# Patient Record
Sex: FEMALE | Race: AMERICAN INDIAN OR ALASKA NATIVE | Employment: UNEMPLOYED
[De-identification: names, ages, dates, MRNs, and addresses within clinical notes are randomized per-mention and may not be internally consistent; named-entity substitution may affect disease eponyms.]

---

## 2017-01-10 ENCOUNTER — TELEPHONE (OUTPATIENT)
Dept: PULMONOLOGY | Facility: CLINIC | Age: 72
End: 2017-01-10

## 2017-01-10 DIAGNOSIS — J44.1 ACUTE EXACERBATION OF COPD WITH ASTHMA (HCC): Primary | ICD-10-CM

## 2017-01-10 DIAGNOSIS — J45.901 ACUTE EXACERBATION OF COPD WITH ASTHMA (HCC): Primary | ICD-10-CM

## 2017-01-10 RX ORDER — LEVOFLOXACIN 500 MG/1
500 TABLET, FILM COATED ORAL DAILY
Qty: 5 TABLET | Refills: 0 | Status: SHIPPED | OUTPATIENT
Start: 2017-01-10 | End: 2017-01-15

## 2017-01-20 ENCOUNTER — OFFICE VISIT (OUTPATIENT)
Dept: PULMONOLOGY | Facility: CLINIC | Age: 72
End: 2017-01-20

## 2017-01-20 VITALS
WEIGHT: 117 LBS | HEIGHT: 55 IN | DIASTOLIC BLOOD PRESSURE: 101 MMHG | HEART RATE: 57 BPM | OXYGEN SATURATION: 96 % | BODY MASS INDEX: 27.08 KG/M2 | SYSTOLIC BLOOD PRESSURE: 202 MMHG | RESPIRATION RATE: 16 BRPM

## 2017-01-20 VITALS — HEART RATE: 75 BPM | WEIGHT: 117 LBS | BODY MASS INDEX: 27.08 KG/M2 | HEIGHT: 55 IN

## 2017-01-20 DIAGNOSIS — J45.909 ASTHMA IN ADULT, UNSPECIFIED ASTHMA SEVERITY, UNCOMPLICATED: Primary | ICD-10-CM

## 2017-01-20 DIAGNOSIS — J45.52 ASTHMA, SEVERE PERSISTENT, POORLY-CONTROLLED, WITH STATUS ASTHMATICUS: ICD-10-CM

## 2017-01-20 DIAGNOSIS — J45.52 ASTHMA, SEVERE PERSISTENT, POORLY-CONTROLLED, WITH STATUS ASTHMATICUS: Primary | ICD-10-CM

## 2017-01-20 PROCEDURE — 4040F PNEUMOC VAC/ADMIN/RCVD: CPT | Performed by: INTERNAL MEDICINE

## 2017-01-20 PROCEDURE — G8399 PT W/DXA RESULTS DOCUMENT: HCPCS | Performed by: INTERNAL MEDICINE

## 2017-01-20 PROCEDURE — G8484 FLU IMMUNIZE NO ADMIN: HCPCS | Performed by: INTERNAL MEDICINE

## 2017-01-20 PROCEDURE — G8420 CALC BMI NORM PARAMETERS: HCPCS | Performed by: INTERNAL MEDICINE

## 2017-01-20 PROCEDURE — 3017F COLORECTAL CA SCREEN DOC REV: CPT | Performed by: INTERNAL MEDICINE

## 2017-01-20 PROCEDURE — 3014F SCREEN MAMMO DOC REV: CPT | Performed by: INTERNAL MEDICINE

## 2017-01-20 PROCEDURE — G8427 DOCREV CUR MEDS BY ELIG CLIN: HCPCS | Performed by: INTERNAL MEDICINE

## 2017-01-20 PROCEDURE — 1123F ACP DISCUSS/DSCN MKR DOCD: CPT | Performed by: INTERNAL MEDICINE

## 2017-01-20 PROCEDURE — 99213 OFFICE O/P EST LOW 20 MIN: CPT | Performed by: INTERNAL MEDICINE

## 2017-01-20 PROCEDURE — 1090F PRES/ABSN URINE INCON ASSESS: CPT | Performed by: INTERNAL MEDICINE

## 2017-01-20 PROCEDURE — 1036F TOBACCO NON-USER: CPT | Performed by: INTERNAL MEDICINE

## 2017-01-20 RX ORDER — MONTELUKAST SODIUM 10 MG/1
10 TABLET ORAL NIGHTLY
Qty: 90 TABLET | Refills: 3 | Status: SHIPPED | OUTPATIENT
Start: 2017-01-20 | End: 2018-01-30 | Stop reason: SDUPTHER

## 2017-01-20 RX ORDER — BUDESONIDE 0.5 MG/2ML
1 INHALANT ORAL 2 TIMES DAILY
COMMUNITY
End: 2017-01-20 | Stop reason: ALTCHOICE

## 2017-01-20 RX ORDER — PREDNISONE 10 MG/1
TABLET ORAL
Qty: 30 TABLET | Refills: 0 | Status: SHIPPED | OUTPATIENT
Start: 2017-01-20 | End: 2017-05-15 | Stop reason: ALTCHOICE

## 2017-01-20 RX ORDER — ALBUTEROL SULFATE 90 UG/1
2 AEROSOL, METERED RESPIRATORY (INHALATION) EVERY 6 HOURS PRN
Qty: 3 INHALER | Refills: 3 | Status: SHIPPED | OUTPATIENT
Start: 2017-01-20 | End: 2018-06-25 | Stop reason: SDUPTHER

## 2017-01-20 RX ORDER — PREDNISONE 10 MG/1
TABLET ORAL
Qty: 30 TABLET | Refills: 0 | Status: SHIPPED | OUTPATIENT
Start: 2017-01-20 | End: 2017-01-20 | Stop reason: SDUPTHER

## 2017-01-20 ASSESSMENT — ENCOUNTER SYMPTOMS
SHORTNESS OF BREATH: 1
EYES NEGATIVE: 1
GASTROINTESTINAL NEGATIVE: 1
COUGH: 1
WHEEZING: 1

## 2017-02-20 ENCOUNTER — OFFICE VISIT (OUTPATIENT)
Dept: PULMONOLOGY | Facility: CLINIC | Age: 72
End: 2017-02-20

## 2017-02-20 VITALS
RESPIRATION RATE: 16 BRPM | OXYGEN SATURATION: 97 % | DIASTOLIC BLOOD PRESSURE: 96 MMHG | BODY MASS INDEX: 27.58 KG/M2 | HEIGHT: 56 IN | WEIGHT: 122.6 LBS | HEART RATE: 50 BPM | SYSTOLIC BLOOD PRESSURE: 177 MMHG

## 2017-02-20 DIAGNOSIS — J45.909 ASTHMA, VERY POORLY CONTROLLED, UNSPECIFIED ASTHMA SEVERITY, UNCOMPLICATED: Primary | ICD-10-CM

## 2017-02-20 DIAGNOSIS — F51.04 PSYCHOPHYSIOLOGIC INSOMNIA: ICD-10-CM

## 2017-02-20 PROCEDURE — 1036F TOBACCO NON-USER: CPT | Performed by: INTERNAL MEDICINE

## 2017-02-20 PROCEDURE — 3017F COLORECTAL CA SCREEN DOC REV: CPT | Performed by: INTERNAL MEDICINE

## 2017-02-20 PROCEDURE — 1123F ACP DISCUSS/DSCN MKR DOCD: CPT | Performed by: INTERNAL MEDICINE

## 2017-02-20 PROCEDURE — 3014F SCREEN MAMMO DOC REV: CPT | Performed by: INTERNAL MEDICINE

## 2017-02-20 PROCEDURE — G8427 DOCREV CUR MEDS BY ELIG CLIN: HCPCS | Performed by: INTERNAL MEDICINE

## 2017-02-20 PROCEDURE — G8399 PT W/DXA RESULTS DOCUMENT: HCPCS | Performed by: INTERNAL MEDICINE

## 2017-02-20 PROCEDURE — G8484 FLU IMMUNIZE NO ADMIN: HCPCS | Performed by: INTERNAL MEDICINE

## 2017-02-20 PROCEDURE — 99213 OFFICE O/P EST LOW 20 MIN: CPT | Performed by: INTERNAL MEDICINE

## 2017-02-20 PROCEDURE — 4040F PNEUMOC VAC/ADMIN/RCVD: CPT | Performed by: INTERNAL MEDICINE

## 2017-02-20 PROCEDURE — G8420 CALC BMI NORM PARAMETERS: HCPCS | Performed by: INTERNAL MEDICINE

## 2017-02-20 PROCEDURE — 1090F PRES/ABSN URINE INCON ASSESS: CPT | Performed by: INTERNAL MEDICINE

## 2017-02-20 RX ORDER — BUDESONIDE AND FORMOTEROL FUMARATE DIHYDRATE 160; 4.5 UG/1; UG/1
2 AEROSOL RESPIRATORY (INHALATION) 2 TIMES DAILY
Qty: 1 INHALER | Refills: 11 | Status: SHIPPED | OUTPATIENT
Start: 2017-02-20 | End: 2017-05-15

## 2017-02-20 RX ORDER — BUDESONIDE AND FORMOTEROL FUMARATE DIHYDRATE 160; 4.5 UG/1; UG/1
2 AEROSOL RESPIRATORY (INHALATION) 2 TIMES DAILY
Qty: 2 INHALER | Refills: 0 | COMMUNITY
Start: 2017-02-20 | End: 2017-05-15 | Stop reason: SDUPTHER

## 2017-02-20 RX ORDER — POTASSIUM CHLORIDE 20 MEQ/1
TABLET, EXTENDED RELEASE ORAL
COMMUNITY
Start: 2017-02-19

## 2017-02-20 RX ORDER — BUDESONIDE AND FORMOTEROL FUMARATE DIHYDRATE 160; 4.5 UG/1; UG/1
2 AEROSOL RESPIRATORY (INHALATION) 2 TIMES DAILY
COMMUNITY
End: 2017-05-15 | Stop reason: SDUPTHER

## 2017-02-20 ASSESSMENT — ENCOUNTER SYMPTOMS
GASTROINTESTINAL NEGATIVE: 1
WHEEZING: 1
SHORTNESS OF BREATH: 1
EYES NEGATIVE: 1

## 2017-05-15 ENCOUNTER — OFFICE VISIT (OUTPATIENT)
Dept: PULMONOLOGY | Age: 72
End: 2017-05-15
Payer: MEDICARE

## 2017-05-15 VITALS
TEMPERATURE: 97.2 F | HEART RATE: 49 BPM | DIASTOLIC BLOOD PRESSURE: 88 MMHG | SYSTOLIC BLOOD PRESSURE: 175 MMHG | HEIGHT: 56 IN | OXYGEN SATURATION: 98 % | WEIGHT: 122 LBS | BODY MASS INDEX: 27.44 KG/M2 | RESPIRATION RATE: 12 BRPM

## 2017-05-15 DIAGNOSIS — J45.50 ASTHMA, SEVERE PERSISTENT, WELL-CONTROLLED: Primary | ICD-10-CM

## 2017-05-15 PROCEDURE — 1036F TOBACCO NON-USER: CPT | Performed by: INTERNAL MEDICINE

## 2017-05-15 PROCEDURE — 3014F SCREEN MAMMO DOC REV: CPT | Performed by: INTERNAL MEDICINE

## 2017-05-15 PROCEDURE — 3017F COLORECTAL CA SCREEN DOC REV: CPT | Performed by: INTERNAL MEDICINE

## 2017-05-15 PROCEDURE — 1123F ACP DISCUSS/DSCN MKR DOCD: CPT | Performed by: INTERNAL MEDICINE

## 2017-05-15 PROCEDURE — G8399 PT W/DXA RESULTS DOCUMENT: HCPCS | Performed by: INTERNAL MEDICINE

## 2017-05-15 PROCEDURE — G8420 CALC BMI NORM PARAMETERS: HCPCS | Performed by: INTERNAL MEDICINE

## 2017-05-15 PROCEDURE — G8427 DOCREV CUR MEDS BY ELIG CLIN: HCPCS | Performed by: INTERNAL MEDICINE

## 2017-05-15 PROCEDURE — 99213 OFFICE O/P EST LOW 20 MIN: CPT | Performed by: INTERNAL MEDICINE

## 2017-05-15 PROCEDURE — 4040F PNEUMOC VAC/ADMIN/RCVD: CPT | Performed by: INTERNAL MEDICINE

## 2017-05-15 PROCEDURE — 1090F PRES/ABSN URINE INCON ASSESS: CPT | Performed by: INTERNAL MEDICINE

## 2017-05-15 RX ORDER — BUDESONIDE 0.5 MG/2ML
1 INHALANT ORAL 2 TIMES DAILY
COMMUNITY
End: 2017-05-15 | Stop reason: ALTCHOICE

## 2017-05-15 RX ORDER — MELOXICAM 15 MG/1
15 TABLET ORAL DAILY
COMMUNITY
End: 2018-02-27 | Stop reason: ALTCHOICE

## 2017-05-15 RX ORDER — FERROUS SULFATE 325(65) MG
325 TABLET ORAL
COMMUNITY

## 2017-05-15 RX ORDER — FEXOFENADINE HYDROCHLORIDE 60 MG/1
120 TABLET, FILM COATED ORAL DAILY
COMMUNITY
End: 2018-02-27 | Stop reason: ALTCHOICE

## 2017-05-15 ASSESSMENT — ENCOUNTER SYMPTOMS
GASTROINTESTINAL NEGATIVE: 1
RESPIRATORY NEGATIVE: 1
EYES NEGATIVE: 1

## 2017-10-16 ENCOUNTER — OFFICE VISIT (OUTPATIENT)
Dept: PULMONOLOGY | Age: 72
End: 2017-10-16
Payer: MEDICARE

## 2017-10-16 VITALS
OXYGEN SATURATION: 99 % | HEART RATE: 80 BPM | RESPIRATION RATE: 16 BRPM | SYSTOLIC BLOOD PRESSURE: 169 MMHG | DIASTOLIC BLOOD PRESSURE: 89 MMHG | BODY MASS INDEX: 23.59 KG/M2 | WEIGHT: 117 LBS | HEIGHT: 59 IN

## 2017-10-16 DIAGNOSIS — J45.50 ASTHMA, SEVERE PERSISTENT, WELL-CONTROLLED: Primary | ICD-10-CM

## 2017-10-16 PROCEDURE — 4040F PNEUMOC VAC/ADMIN/RCVD: CPT | Performed by: INTERNAL MEDICINE

## 2017-10-16 PROCEDURE — 1036F TOBACCO NON-USER: CPT | Performed by: INTERNAL MEDICINE

## 2017-10-16 PROCEDURE — 1123F ACP DISCUSS/DSCN MKR DOCD: CPT | Performed by: INTERNAL MEDICINE

## 2017-10-16 PROCEDURE — G8427 DOCREV CUR MEDS BY ELIG CLIN: HCPCS | Performed by: INTERNAL MEDICINE

## 2017-10-16 PROCEDURE — 3014F SCREEN MAMMO DOC REV: CPT | Performed by: INTERNAL MEDICINE

## 2017-10-16 PROCEDURE — G8420 CALC BMI NORM PARAMETERS: HCPCS | Performed by: INTERNAL MEDICINE

## 2017-10-16 PROCEDURE — 1090F PRES/ABSN URINE INCON ASSESS: CPT | Performed by: INTERNAL MEDICINE

## 2017-10-16 PROCEDURE — G8484 FLU IMMUNIZE NO ADMIN: HCPCS | Performed by: INTERNAL MEDICINE

## 2017-10-16 PROCEDURE — 99213 OFFICE O/P EST LOW 20 MIN: CPT | Performed by: INTERNAL MEDICINE

## 2017-10-16 PROCEDURE — G8399 PT W/DXA RESULTS DOCUMENT: HCPCS | Performed by: INTERNAL MEDICINE

## 2017-10-16 PROCEDURE — 3017F COLORECTAL CA SCREEN DOC REV: CPT | Performed by: INTERNAL MEDICINE

## 2017-10-16 ASSESSMENT — ENCOUNTER SYMPTOMS
EYES NEGATIVE: 1
RESPIRATORY NEGATIVE: 1
GASTROINTESTINAL NEGATIVE: 1

## 2018-01-30 DIAGNOSIS — J45.52 ASTHMA, SEVERE PERSISTENT, POORLY-CONTROLLED, WITH STATUS ASTHMATICUS: ICD-10-CM

## 2018-01-30 RX ORDER — MONTELUKAST SODIUM 10 MG/1
TABLET ORAL
Qty: 90 TABLET | Refills: 3 | Status: SHIPPED | OUTPATIENT
Start: 2018-01-30 | End: 2019-01-28 | Stop reason: SDUPTHER

## 2018-02-27 ENCOUNTER — OFFICE VISIT (OUTPATIENT)
Dept: PULMONOLOGY | Age: 73
End: 2018-02-27
Payer: MEDICARE

## 2018-02-27 VITALS
WEIGHT: 118 LBS | DIASTOLIC BLOOD PRESSURE: 90 MMHG | HEART RATE: 48 BPM | OXYGEN SATURATION: 96 % | BODY MASS INDEX: 27.31 KG/M2 | TEMPERATURE: 97 F | SYSTOLIC BLOOD PRESSURE: 176 MMHG | RESPIRATION RATE: 14 BRPM | HEIGHT: 55 IN

## 2018-02-27 DIAGNOSIS — R05.9 COUGH IN ADULT: ICD-10-CM

## 2018-02-27 DIAGNOSIS — R09.82 POSTNASAL DRIP: ICD-10-CM

## 2018-02-27 DIAGNOSIS — J45.50 ASTHMA, SEVERE PERSISTENT, WELL-CONTROLLED: Primary | ICD-10-CM

## 2018-02-27 PROCEDURE — 99213 OFFICE O/P EST LOW 20 MIN: CPT | Performed by: NURSE PRACTITIONER

## 2018-02-27 RX ORDER — LOSARTAN POTASSIUM 50 MG/1
50 TABLET ORAL DAILY
COMMUNITY

## 2018-02-27 RX ORDER — FLUTICASONE PROPIONATE 50 MCG
1 SPRAY, SUSPENSION (ML) NASAL DAILY
COMMUNITY

## 2018-02-27 ASSESSMENT — ENCOUNTER SYMPTOMS
COUGH: 1
ALLERGIC/IMMUNOLOGIC NEGATIVE: 1
GASTROINTESTINAL NEGATIVE: 1
EYES NEGATIVE: 1

## 2018-02-27 NOTE — PROGRESS NOTES
Segs Absolute 5.08 1.8 - 7.7 k/uL    Absolute Lymph # 2.14 1.0 - 4.8 k/uL    Absolute Mono # 0.36 0.2 - 0.8 k/uL    Absolute Eos # 0.30 0.0 - 0.4 k/uL    Basophils # 0.04 0.0 - 0.2 k/uL   Hepatic Function Panel   Result Value Ref Range    Alb 3.9 3.5 - 5.2 g/dL    Alkaline Phosphatase 59 35 - 104 U/L    ALT 8 5 - 33 U/L    AST 26 <32 U/L    Total Bilirubin 0.70 0.3 - 1.2 mg/dL    Bilirubin, Direct 0.16 <0.31 mg/dL    Bilirubin, Indirect 0.54 0.00 - 1.00 mg/dL    Total Protein 7.2 6.4 - 8.3 g/dL    Globulin NOT REPORTED 1.5 - 3.8 g/dL    Albumin/Globulin Ratio NOT REPORTED 1.7 - 2.5   Lipase   Result Value Ref Range    Lipase 16 13 - 60 U/L   Urinalysis   Result Value Ref Range    Color, UA STRAW (A) YEL    Turbidity UA CLEAR CLEAR    Glucose, Ur NEGATIVE NEG    Bilirubin Urine NEGATIVE NEG    Ketones, Urine NEGATIVE NEG    Specific Gravity, UA 1.015 1.005 - 1.030    Urine Hgb 2+ (A) NEG    pH, UA 7.5 5.0 - 8.0    Protein, UA NEGATIVE NEG    Urobilinogen, Urine Normal NORM    Nitrite, Urine NEGATIVE NEG    Leukocyte Esterase, Urine TRACE (A) NEG    Urinalysis Comments NOT REPORTED    Magnesium   Result Value Ref Range    Magnesium 1.6 1.6 - 2.6 mg/dL   Microscopic Urinalysis   Result Value Ref Range    -          WBC, UA 0 TO 2 0 - 5 /HPF    RBC, UA 5 TO 10 0 - 2 /HPF    Casts UA NOT REPORTED 0 - 2 /LPF    Crystals UA NOT REPORTED NONE /HPF    Epithelial Cells UA FEW /HPF    Renal Epithelial, Urine NOT REPORTED 0 /HPF    Bacteria, UA FEW (A) NONE    Mucus, UA NOT REPORTED NONE    Trichomonas, UA NOT REPORTED NONE    Amorphous, UA 1+ (A) NONE    Other Observations UA NOT REPORTED NREQ    Yeast, UA NOT REPORTED NONE       No results found. Assessment:      1. Asthma, severe persistent, well-controlled    2. Postnasal drip    3. Cough in adult          Plan:      1. Medications reviewed, continue as ordered. Dulera and Singulair. 2. Refills were provided - no  3.  Educated and clarified the medication

## 2018-04-04 ENCOUNTER — TELEPHONE (OUTPATIENT)
Dept: PULMONOLOGY | Age: 73
End: 2018-04-04

## 2018-04-04 DIAGNOSIS — J45.51 SEVERE PERSISTENT ASTHMA WITH EXACERBATION: Primary | ICD-10-CM

## 2018-04-04 RX ORDER — PREDNISONE 20 MG/1
40 TABLET ORAL DAILY
Qty: 10 TABLET | Refills: 0 | Status: SHIPPED | OUTPATIENT
Start: 2018-04-04 | End: 2018-04-09

## 2018-06-25 DIAGNOSIS — J45.50 ASTHMA, SEVERE PERSISTENT, WELL-CONTROLLED: ICD-10-CM

## 2018-06-25 DIAGNOSIS — J45.52 ASTHMA, SEVERE PERSISTENT, POORLY-CONTROLLED, WITH STATUS ASTHMATICUS: ICD-10-CM

## 2018-06-25 DIAGNOSIS — J45.909 ASTHMA, VERY POORLY CONTROLLED, UNSPECIFIED ASTHMA SEVERITY, UNCOMPLICATED: ICD-10-CM

## 2018-06-25 RX ORDER — MOMETASONE FUROATE AND FORMOTEROL FUMARATE DIHYDRATE 200; 5 UG/1; UG/1
AEROSOL RESPIRATORY (INHALATION)
Qty: 3 INHALER | Refills: 2 | Status: SHIPPED | OUTPATIENT
Start: 2018-06-25 | End: 2021-03-16 | Stop reason: SDUPTHER

## 2018-06-25 RX ORDER — BUDESONIDE AND FORMOTEROL FUMARATE DIHYDRATE 160; 4.5 UG/1; UG/1
AEROSOL RESPIRATORY (INHALATION)
Qty: 10.2 G | Refills: 11 | OUTPATIENT
Start: 2018-06-25

## 2019-01-28 DIAGNOSIS — J45.52 ASTHMA, SEVERE PERSISTENT, POORLY-CONTROLLED, WITH STATUS ASTHMATICUS: ICD-10-CM

## 2019-01-28 RX ORDER — MONTELUKAST SODIUM 10 MG/1
TABLET ORAL
Qty: 90 TABLET | Refills: 0 | Status: SHIPPED | OUTPATIENT
Start: 2019-01-28 | End: 2019-04-28 | Stop reason: SDUPTHER

## 2019-04-28 DIAGNOSIS — J45.52 ASTHMA, SEVERE PERSISTENT, POORLY-CONTROLLED, WITH STATUS ASTHMATICUS: ICD-10-CM

## 2019-04-29 RX ORDER — MONTELUKAST SODIUM 10 MG/1
TABLET ORAL
Qty: 90 TABLET | Refills: 3 | Status: SHIPPED | OUTPATIENT
Start: 2019-04-29 | End: 2020-04-24

## 2019-04-29 NOTE — TELEPHONE ENCOUNTER
Dr Be Man, patient last seen in the office on 2/27/18 by [facility]. She cancelled her appointment in March and is now scheduled to see you on 4/30/19. Per Jolene Escobar last dictation patient to continue this medication. Please sign for refill if ok. Thank you.

## 2019-04-30 ENCOUNTER — HOSPITAL ENCOUNTER (OUTPATIENT)
Age: 74
Discharge: HOME | End: 2019-04-30
Payer: MEDICARE

## 2019-04-30 ENCOUNTER — OFFICE VISIT (OUTPATIENT)
Dept: PULMONOLOGY | Age: 74
End: 2019-04-30
Payer: MEDICARE

## 2019-04-30 VITALS
SYSTOLIC BLOOD PRESSURE: 139 MMHG | WEIGHT: 124 LBS | HEART RATE: 64 BPM | HEIGHT: 56 IN | RESPIRATION RATE: 14 BRPM | OXYGEN SATURATION: 98 % | BODY MASS INDEX: 27.9 KG/M2 | DIASTOLIC BLOOD PRESSURE: 89 MMHG

## 2019-04-30 DIAGNOSIS — J45.52 ASTHMA, SEVERE PERSISTENT, POORLY-CONTROLLED, WITH STATUS ASTHMATICUS: ICD-10-CM

## 2019-04-30 DIAGNOSIS — J82.83 EOSINOPHILIC ASTHMA: ICD-10-CM

## 2019-04-30 DIAGNOSIS — J45.52 ASTHMA, SEVERE PERSISTENT, POORLY-CONTROLLED, WITH STATUS ASTHMATICUS: Primary | ICD-10-CM

## 2019-04-30 LAB
EOSINOPHILS ABSOLUTE: 523 /UL (ref 200–400)
EOSINOPHILS RELATIVE PERCENT: ABNORMAL %
IGE: 53 IU/ML
WBC # BLD: ABNORMAL K/UL

## 2019-04-30 PROCEDURE — 1090F PRES/ABSN URINE INCON ASSESS: CPT | Performed by: INTERNAL MEDICINE

## 2019-04-30 PROCEDURE — 3017F COLORECTAL CA SCREEN DOC REV: CPT | Performed by: INTERNAL MEDICINE

## 2019-04-30 PROCEDURE — G8399 PT W/DXA RESULTS DOCUMENT: HCPCS | Performed by: INTERNAL MEDICINE

## 2019-04-30 PROCEDURE — 85048 AUTOMATED LEUKOCYTE COUNT: CPT

## 2019-04-30 PROCEDURE — 4040F PNEUMOC VAC/ADMIN/RCVD: CPT | Performed by: INTERNAL MEDICINE

## 2019-04-30 PROCEDURE — G8419 CALC BMI OUT NRM PARAM NOF/U: HCPCS | Performed by: INTERNAL MEDICINE

## 2019-04-30 PROCEDURE — 99213 OFFICE O/P EST LOW 20 MIN: CPT | Performed by: INTERNAL MEDICINE

## 2019-04-30 PROCEDURE — 1036F TOBACCO NON-USER: CPT | Performed by: INTERNAL MEDICINE

## 2019-04-30 PROCEDURE — 1123F ACP DISCUSS/DSCN MKR DOCD: CPT | Performed by: INTERNAL MEDICINE

## 2019-04-30 PROCEDURE — 82785 ASSAY OF IGE: CPT

## 2019-04-30 PROCEDURE — 36415 COLL VENOUS BLD VENIPUNCTURE: CPT

## 2019-04-30 PROCEDURE — G8427 DOCREV CUR MEDS BY ELIG CLIN: HCPCS | Performed by: INTERNAL MEDICINE

## 2019-04-30 RX ORDER — PREDNISONE 20 MG/1
40 TABLET ORAL DAILY
Qty: 10 TABLET | Refills: 0 | Status: SHIPPED | OUTPATIENT
Start: 2019-04-30 | End: 2019-05-05

## 2019-05-02 ASSESSMENT — ENCOUNTER SYMPTOMS
WHEEZING: 1
GASTROINTESTINAL NEGATIVE: 1
COUGH: 1
CHEST TIGHTNESS: 1
SHORTNESS OF BREATH: 1
EYES NEGATIVE: 1

## 2019-05-02 NOTE — PROGRESS NOTES
Subjective:      Patient ID: Jayesh Clark is a 69 y.o. female. HPI  Follow-up visit for asthma. Since her visit a year ago asthma has not been under good control. She states that control worsened about 2 or 3 months ago. Daily shortness of breath, wheezing, cough, and need for albuterol aerosols. FENO elevated at 32 ppb. Uses Dulera 200 µg, Singulair 10 mg, and Ventolin HFA several times a day. Denies environmental triggers. No recent IgE or eosinophil count. Review of Systems   Constitutional: Negative. HENT: Negative. Eyes: Negative. Respiratory: Positive for cough, chest tightness, shortness of breath and wheezing. Cardiovascular: Negative. Gastrointestinal: Negative. All other systems reviewed and are negative. Objective:     Physical Exam   Constitutional: She is oriented to person, place, and time. She appears well-developed and well-nourished. HENT:   Head: Normocephalic. Right Ear: External ear normal.   Left Ear: External ear normal.   Mouth/Throat: Oropharynx is clear and moist. No oropharyngeal exudate. Eyes: Conjunctivae are normal. No scleral icterus. Neck: Neck supple. No JVD present. No tracheal deviation present. No thyromegaly present. Cardiovascular: Normal rate, regular rhythm and normal heart sounds. Exam reveals no gallop. No murmur heard. Pulmonary/Chest: Effort normal. No respiratory distress. She has no wheezes. She has no rales. She exhibits no tenderness. Abdominal: Soft. There is no tenderness. Musculoskeletal: She exhibits no edema. Lymphadenopathy:     She has no cervical adenopathy. Neurological: She is alert and oriented to person, place, and time. Skin: Skin is warm and dry. Nursing note and vitals reviewed.       Wt Readings from Last 3 Encounters:   04/30/19 124 lb (56.2 kg)   02/27/18 118 lb (53.5 kg)   10/16/17 117 lb (53.1 kg)          Results for orders placed or performed during the hospital encounter of 06/11/13 Sputum culture   Result Value Ref Range    Specimen EXPECTORATED SPUTUM     Special Requests NOT REPORTED     Culture (A)      >10 EPITHELIAL CELLS/LPF: SPECIMEN IS CONTAMINATED WITH ORAL PHARYNGEAL ELBA AND    Culture (A)       IS UNACCEPTABLE FOR BACTERIAL CULTURE. PLEASE SUBMIT ANOTHER SPECIMEN. Culture ANGELA NOTIFIED 6/10/13 AT 1400. Status Pending     Culture       [address], [place] [facility] 3 [phone]    Status FINAL 06/10/2013    Gram stain   Result Value Ref Range    Specimen EXPECTORATED SPUTUM     Special Requests NOT REPORTED     Direct Exam (A)      >10 EPITHELIAL CELLS/LPF: SPECIMEN IS CONTAMINATED WITH ORAL PHARYNGEAL ELBA AND    Direct Exam (A)       IS UNACCEPTABLE FOR BACTERIAL CULTURE. PLEASE SUBMIT ANOTHER SPECIMEN. Direct Exam Karyn Triplett NOTIFIED 6/10/13 AT 1400. Status Pending     Direct Exam       [address], [place] [facility] 3 [phone]    Status FINAL 06/10/2013        Assessment:         1. Asthma, severe persistent, poorly-controlled, with status asthmaticus    2. Eosinophilic asthma ([facility] [address]          Plan:      1. Self-reported symptoms and elevated FENO suggests poorly controlled asthma. 2. Prednisone 40 mg daily for 5 days. 3. Serum eosinophils and IgE. 4. Discussed in generalities biologic therapy for asthma. 5. Return in 6 weeks.      Electronically signed by Dorothe Opitz, DO on 5/2/2019at 1:18 PM

## 2019-06-11 ENCOUNTER — OFFICE VISIT (OUTPATIENT)
Dept: PULMONOLOGY | Age: 74
End: 2019-06-11
Payer: MEDICARE

## 2019-06-11 VITALS
BODY MASS INDEX: 25.89 KG/M2 | HEART RATE: 58 BPM | HEIGHT: 57 IN | RESPIRATION RATE: 12 BRPM | SYSTOLIC BLOOD PRESSURE: 176 MMHG | DIASTOLIC BLOOD PRESSURE: 99 MMHG | OXYGEN SATURATION: 98 % | WEIGHT: 120 LBS

## 2019-06-11 DIAGNOSIS — J82.83 EOSINOPHILIC ASTHMA: Primary | ICD-10-CM

## 2019-06-11 DIAGNOSIS — I10 ESSENTIAL HYPERTENSION: ICD-10-CM

## 2019-06-11 DIAGNOSIS — Z91.09 MULTIPLE ENVIRONMENTAL ALLERGIES: ICD-10-CM

## 2019-06-11 DIAGNOSIS — J45.50 POORLY CONTROLLED SEVERE PERSISTENT ASTHMA WITHOUT COMPLICATION: ICD-10-CM

## 2019-06-11 PROCEDURE — 4040F PNEUMOC VAC/ADMIN/RCVD: CPT | Performed by: INTERNAL MEDICINE

## 2019-06-11 PROCEDURE — 1123F ACP DISCUSS/DSCN MKR DOCD: CPT | Performed by: INTERNAL MEDICINE

## 2019-06-11 PROCEDURE — 1036F TOBACCO NON-USER: CPT | Performed by: INTERNAL MEDICINE

## 2019-06-11 PROCEDURE — 99214 OFFICE O/P EST MOD 30 MIN: CPT | Performed by: INTERNAL MEDICINE

## 2019-06-11 PROCEDURE — 1090F PRES/ABSN URINE INCON ASSESS: CPT | Performed by: INTERNAL MEDICINE

## 2019-06-11 PROCEDURE — G8399 PT W/DXA RESULTS DOCUMENT: HCPCS | Performed by: INTERNAL MEDICINE

## 2019-06-11 PROCEDURE — 3017F COLORECTAL CA SCREEN DOC REV: CPT | Performed by: INTERNAL MEDICINE

## 2019-06-11 PROCEDURE — G8427 DOCREV CUR MEDS BY ELIG CLIN: HCPCS | Performed by: INTERNAL MEDICINE

## 2019-06-11 PROCEDURE — G8419 CALC BMI OUT NRM PARAM NOF/U: HCPCS | Performed by: INTERNAL MEDICINE

## 2019-06-11 ASSESSMENT — ENCOUNTER SYMPTOMS
EYES NEGATIVE: 1
WHEEZING: 1
SHORTNESS OF BREATH: 1
COUGH: 1
GASTROINTESTINAL NEGATIVE: 1

## 2019-06-11 NOTE — PROGRESS NOTES
Subjective:      Patient ID: Ozzy Vora is a 69 y.o. female. HPI  Follow-up visit for severe, persistent asthma. Unfortunately asthma remains under poor control. Daily cough, shortness of breath, and wheezing. Also reports nocturnal symptoms. Uses albuterol \"frequently\". Also on albuterol aerosols. Compliant with Dulera 200 mcg and montelukast.  Serum IgE not elevated at 43. Eosinophils elevated at 523/mcL. Currently receiving immunotherapy per Dr. Gray Larsen. Review of Systems   Constitutional: Negative. HENT: Negative. Eyes: Negative. Respiratory: Positive for cough, shortness of breath and wheezing. Cardiovascular: Negative. Gastrointestinal: Negative. All other systems reviewed and are negative. Objective:     Physical Exam   Constitutional: She is oriented to person, place, and time. She appears well-developed and well-nourished. HENT:   Head: Normocephalic. Right Ear: External ear normal.   Left Ear: External ear normal.   Mouth/Throat: Oropharynx is clear and moist. No oropharyngeal exudate. Eyes: Conjunctivae are normal. No scleral icterus. Neck: Neck supple. No JVD present. No tracheal deviation present. No thyromegaly present. Cardiovascular: Normal rate, regular rhythm and normal heart sounds. Exam reveals no gallop. No murmur heard. Pulmonary/Chest: Effort normal. No respiratory distress. She has no wheezes. She has no rales. She exhibits no tenderness. Abdominal: Soft. There is no tenderness. Musculoskeletal: She exhibits no edema. Lymphadenopathy:     She has no cervical adenopathy. Neurological: She is alert and oriented to person, place, and time. Skin: Skin is warm and dry. Nursing note and vitals reviewed.       Wt Readings from Last 3 Encounters:   06/11/19 120 lb (54.4 kg)   04/30/19 124 lb (56.2 kg)   02/27/18 118 lb (53.5 kg)          Results for orders placed or performed during the hospital encounter of 04/30/19   IgE   Result Value Ref Range    IgE 53 <101 IU/mL   Eosinophil Count   Result Value Ref Range    WBC NOT REPORTED k/uL    Eosinophils % NOT REPORTED %    Eosinophils # 523 (H) 200 - 400 /uL       Assessment:         1. Eosinophilic asthma ([address]    2. Poorly controlled severe persistent asthma without complication    3. Multiple environmental allergies    4. Essential hypertension          Plan:      1. Elevated eosinophils suggests of eosinophilic asthma. Patient a candidate for biologic therapy. Will begin Nucala 100 mcg subcu each month. Discussed risks benefits and rationale and she accepts. Patient states that she has had chickenpox. Cannot recall whether she has had a shingles vaccine. Discussed the slight risk of shingles reactivation with nucala and patient wishes to proceed as quickly as possible with treatment. 2. Continue bronchodilators. 3. Immunotherapy per allergist.  4. Follow-up with Dr. Betito Lee regarding elevated blood pressure. 5. Return in 3 months.      Electronically signed by Maricel Ponce DO on 6/11/2019at 1:03 PM

## 2019-07-03 DIAGNOSIS — J45.52 ASTHMA, SEVERE PERSISTENT, POORLY-CONTROLLED, WITH STATUS ASTHMATICUS: ICD-10-CM

## 2019-07-12 RX ORDER — EPINEPHRINE 0.3 MG/.3ML
0.3 INJECTION SUBCUTANEOUS PRN
Qty: 2 EACH | Refills: 0 | Status: SHIPPED | OUTPATIENT
Start: 2019-07-12

## 2019-07-15 ENCOUNTER — TELEPHONE (OUTPATIENT)
Dept: PULMONOLOGY | Age: 74
End: 2019-07-15

## 2019-07-15 ENCOUNTER — OFFICE VISIT (OUTPATIENT)
Dept: PULMONOLOGY | Age: 74
End: 2019-07-15
Payer: MEDICARE

## 2019-07-15 VITALS — BODY MASS INDEX: 25.89 KG/M2 | HEIGHT: 57 IN | WEIGHT: 120 LBS

## 2019-07-15 DIAGNOSIS — J82.83 EOSINOPHILIC ASTHMA: ICD-10-CM

## 2019-07-15 PROCEDURE — 96372 THER/PROPH/DIAG INJ SC/IM: CPT | Performed by: INTERNAL MEDICINE

## 2019-08-12 ENCOUNTER — OFFICE VISIT (OUTPATIENT)
Dept: PULMONOLOGY | Age: 74
End: 2019-08-12
Payer: MEDICARE

## 2019-08-12 VITALS
OXYGEN SATURATION: 99 % | SYSTOLIC BLOOD PRESSURE: 173 MMHG | DIASTOLIC BLOOD PRESSURE: 91 MMHG | BODY MASS INDEX: 27.44 KG/M2 | HEIGHT: 56 IN | WEIGHT: 122 LBS | HEART RATE: 59 BPM | RESPIRATION RATE: 12 BRPM

## 2019-08-12 DIAGNOSIS — J45.909 PERSISTENT ASTHMA WITHOUT COMPLICATION, UNSPECIFIED ASTHMA SEVERITY: Primary | ICD-10-CM

## 2019-08-12 PROCEDURE — 96372 THER/PROPH/DIAG INJ SC/IM: CPT | Performed by: INTERNAL MEDICINE

## 2019-08-26 ENCOUNTER — APPOINTMENT (OUTPATIENT)
Dept: CT IMAGING | Age: 74
End: 2019-08-26
Payer: MEDICARE

## 2019-08-26 ENCOUNTER — HOSPITAL ENCOUNTER (EMERGENCY)
Age: 74
Discharge: HOME | End: 2019-08-26
Attending: EMERGENCY MEDICINE
Payer: MEDICARE

## 2019-08-26 VITALS
SYSTOLIC BLOOD PRESSURE: 183 MMHG | WEIGHT: 121 LBS | HEART RATE: 65 BPM | BODY MASS INDEX: 27.22 KG/M2 | RESPIRATION RATE: 16 BRPM | DIASTOLIC BLOOD PRESSURE: 89 MMHG | TEMPERATURE: 98.2 F | HEIGHT: 56 IN | OXYGEN SATURATION: 100 %

## 2019-08-26 DIAGNOSIS — R51.9 NONINTRACTABLE EPISODIC HEADACHE, UNSPECIFIED HEADACHE TYPE: ICD-10-CM

## 2019-08-26 DIAGNOSIS — M54.9 MUSCULOSKELETAL BACK PAIN: Primary | ICD-10-CM

## 2019-08-26 PROCEDURE — 72125 CT NECK SPINE W/O DYE: CPT

## 2019-08-26 PROCEDURE — 70450 CT HEAD/BRAIN W/O DYE: CPT

## 2019-08-26 PROCEDURE — 99284 EMERGENCY DEPT VISIT MOD MDM: CPT

## 2019-08-26 RX ORDER — IBUPROFEN 600 MG/1
600 TABLET ORAL EVERY 8 HOURS PRN
Qty: 20 TABLET | Refills: 0 | Status: SHIPPED | OUTPATIENT
Start: 2019-08-26

## 2019-08-26 ASSESSMENT — PAIN SCALES - GENERAL: PAINLEVEL_OUTOF10: 9

## 2019-08-26 ASSESSMENT — PAIN DESCRIPTION - ORIENTATION: ORIENTATION: ANTERIOR

## 2019-08-26 ASSESSMENT — PAIN DESCRIPTION - LOCATION: LOCATION: HEAD

## 2019-08-26 ASSESSMENT — PAIN DESCRIPTION - DESCRIPTORS: DESCRIPTORS: SORE

## 2019-08-26 ASSESSMENT — PAIN DESCRIPTION - PAIN TYPE: TYPE: ACUTE PAIN

## 2019-08-26 ASSESSMENT — ENCOUNTER SYMPTOMS
NAUSEA: 0
VOMITING: 0

## 2019-08-26 NOTE — ED PROVIDER NOTES
The patient was seen and examined by me in conjunction with the mid-level provider. I agree with his/her assessment and treatment plan. On exam the patient has no tenderness at all over the spinous process of C7. My impression is that the CT finding is due to arthritic changes. She is able to be released home.      Fabien Aden MD  08/26/19 [id number]

## 2019-09-09 ENCOUNTER — OFFICE VISIT (OUTPATIENT)
Dept: PULMONOLOGY | Age: 74
End: 2019-09-09
Payer: MEDICARE

## 2019-09-09 VITALS
RESPIRATION RATE: 14 BRPM | SYSTOLIC BLOOD PRESSURE: 131 MMHG | OXYGEN SATURATION: 97 % | BODY MASS INDEX: 26.99 KG/M2 | WEIGHT: 120 LBS | HEART RATE: 59 BPM | HEIGHT: 56 IN | DIASTOLIC BLOOD PRESSURE: 87 MMHG

## 2019-09-09 DIAGNOSIS — J45.50 POORLY CONTROLLED SEVERE PERSISTENT ASTHMA WITHOUT COMPLICATION: Primary | ICD-10-CM

## 2019-09-09 PROCEDURE — 96372 THER/PROPH/DIAG INJ SC/IM: CPT | Performed by: INTERNAL MEDICINE

## 2019-09-09 RX ORDER — MEPOLIZUMAB 100 MG/ML
INJECTION, POWDER, FOR SOLUTION SUBCUTANEOUS
COMMUNITY
Start: 2019-08-22 | End: 2020-07-06

## 2019-09-12 ENCOUNTER — TELEPHONE (OUTPATIENT)
Dept: PULMONOLOGY | Age: 74
End: 2019-09-12

## 2019-10-07 ENCOUNTER — OFFICE VISIT (OUTPATIENT)
Dept: PULMONOLOGY | Age: 74
End: 2019-10-07
Payer: MEDICARE

## 2019-10-07 VITALS
SYSTOLIC BLOOD PRESSURE: 140 MMHG | HEIGHT: 56 IN | HEART RATE: 60 BPM | TEMPERATURE: 97.7 F | BODY MASS INDEX: 26.98 KG/M2 | RESPIRATION RATE: 16 BRPM | DIASTOLIC BLOOD PRESSURE: 98 MMHG | OXYGEN SATURATION: 99 % | WEIGHT: 119.93 LBS

## 2019-10-07 DIAGNOSIS — J82.83 EOSINOPHILIC ASTHMA: ICD-10-CM

## 2019-10-07 DIAGNOSIS — I10 ESSENTIAL HYPERTENSION: ICD-10-CM

## 2019-10-07 DIAGNOSIS — J45.50 SEVERE PERSISTENT ASTHMA, WELL-CONTROLLED: Primary | ICD-10-CM

## 2019-10-07 DIAGNOSIS — Z91.09 MULTIPLE ENVIRONMENTAL ALLERGIES: ICD-10-CM

## 2019-10-07 PROCEDURE — 1123F ACP DISCUSS/DSCN MKR DOCD: CPT | Performed by: INTERNAL MEDICINE

## 2019-10-07 PROCEDURE — 1090F PRES/ABSN URINE INCON ASSESS: CPT | Performed by: INTERNAL MEDICINE

## 2019-10-07 PROCEDURE — 96372 THER/PROPH/DIAG INJ SC/IM: CPT | Performed by: INTERNAL MEDICINE

## 2019-10-07 PROCEDURE — 99213 OFFICE O/P EST LOW 20 MIN: CPT | Performed by: INTERNAL MEDICINE

## 2019-10-07 PROCEDURE — G8399 PT W/DXA RESULTS DOCUMENT: HCPCS | Performed by: INTERNAL MEDICINE

## 2019-10-07 PROCEDURE — 1036F TOBACCO NON-USER: CPT | Performed by: INTERNAL MEDICINE

## 2019-10-07 PROCEDURE — G8419 CALC BMI OUT NRM PARAM NOF/U: HCPCS | Performed by: INTERNAL MEDICINE

## 2019-10-07 PROCEDURE — 3017F COLORECTAL CA SCREEN DOC REV: CPT | Performed by: INTERNAL MEDICINE

## 2019-10-07 PROCEDURE — G8484 FLU IMMUNIZE NO ADMIN: HCPCS | Performed by: INTERNAL MEDICINE

## 2019-10-07 PROCEDURE — 4040F PNEUMOC VAC/ADMIN/RCVD: CPT | Performed by: INTERNAL MEDICINE

## 2019-10-07 PROCEDURE — G8427 DOCREV CUR MEDS BY ELIG CLIN: HCPCS | Performed by: INTERNAL MEDICINE

## 2019-10-07 ASSESSMENT — SLEEP AND FATIGUE QUESTIONNAIRES
HOW LIKELY ARE YOU TO NOD OFF OR FALL ASLEEP WHILE SITTING AND READING: 0
HOW LIKELY ARE YOU TO NOD OFF OR FALL ASLEEP WHILE WATCHING TV: 0
HOW LIKELY ARE YOU TO NOD OFF OR FALL ASLEEP WHEN YOU ARE A PASSENGER IN A CAR FOR AN HOUR WITHOUT A BREAK: 0
HOW LIKELY ARE YOU TO NOD OFF OR FALL ASLEEP WHILE LYING DOWN TO REST IN THE AFTERNOON WHEN CIRCUMSTANCES PERMIT: 0
HOW LIKELY ARE YOU TO NOD OFF OR FALL ASLEEP IN A CAR, WHILE STOPPED FOR A FEW MINUTES IN TRAFFIC: 0
HOW LIKELY ARE YOU TO NOD OFF OR FALL ASLEEP WHILE SITTING AND TALKING TO SOMEONE: 0
HOW LIKELY ARE YOU TO NOD OFF OR FALL ASLEEP WHILE SITTING QUIETLY AFTER LUNCH WITHOUT ALCOHOL: 0
HOW LIKELY ARE YOU TO NOD OFF OR FALL ASLEEP WHILE SITTING INACTIVE IN A PUBLIC PLACE: 0
ESS TOTAL SCORE: 0

## 2019-10-07 ASSESSMENT — ENCOUNTER SYMPTOMS
EYES NEGATIVE: 1
RESPIRATORY NEGATIVE: 1
GASTROINTESTINAL NEGATIVE: 1

## 2019-11-05 ENCOUNTER — OFFICE VISIT (OUTPATIENT)
Dept: PULMONOLOGY | Age: 74
End: 2019-11-05
Payer: MEDICARE

## 2019-11-05 VITALS
DIASTOLIC BLOOD PRESSURE: 95 MMHG | BODY MASS INDEX: 27.19 KG/M2 | TEMPERATURE: 97.6 F | WEIGHT: 121.2 LBS | OXYGEN SATURATION: 97 % | SYSTOLIC BLOOD PRESSURE: 187 MMHG | HEART RATE: 55 BPM

## 2019-11-05 DIAGNOSIS — J45.50 SEVERE PERSISTENT ASTHMA, UNSPECIFIED WHETHER COMPLICATED: Primary | ICD-10-CM

## 2019-11-05 PROCEDURE — 99999 PR OFFICE/OUTPT VISIT,PROCEDURE ONLY: CPT | Performed by: INTERNAL MEDICINE

## 2019-11-05 PROCEDURE — 96372 THER/PROPH/DIAG INJ SC/IM: CPT | Performed by: INTERNAL MEDICINE

## 2019-12-03 ENCOUNTER — OFFICE VISIT (OUTPATIENT)
Dept: PULMONOLOGY | Age: 74
End: 2019-12-03
Payer: MEDICARE

## 2019-12-03 VITALS
WEIGHT: 122 LBS | DIASTOLIC BLOOD PRESSURE: 78 MMHG | SYSTOLIC BLOOD PRESSURE: 158 MMHG | BODY MASS INDEX: 27.37 KG/M2 | OXYGEN SATURATION: 98 % | RESPIRATION RATE: 14 BRPM | HEART RATE: 53 BPM

## 2019-12-03 DIAGNOSIS — J45.50 SEVERE PERSISTENT ASTHMA, UNSPECIFIED WHETHER COMPLICATED: Primary | ICD-10-CM

## 2019-12-03 PROCEDURE — 96372 THER/PROPH/DIAG INJ SC/IM: CPT | Performed by: INTERNAL MEDICINE

## 2020-01-07 ENCOUNTER — TELEPHONE (OUTPATIENT)
Dept: PULMONOLOGY | Age: 75
End: 2020-01-07

## 2020-01-07 ENCOUNTER — OFFICE VISIT (OUTPATIENT)
Dept: PULMONOLOGY | Age: 75
End: 2020-01-07
Payer: MEDICARE

## 2020-01-07 VITALS
SYSTOLIC BLOOD PRESSURE: 180 MMHG | WEIGHT: 120 LBS | HEART RATE: 56 BPM | DIASTOLIC BLOOD PRESSURE: 88 MMHG | BODY MASS INDEX: 26.92 KG/M2

## 2020-01-07 PROCEDURE — 96372 THER/PROPH/DIAG INJ SC/IM: CPT | Performed by: INTERNAL MEDICINE

## 2020-02-04 ENCOUNTER — OFFICE VISIT (OUTPATIENT)
Dept: PULMONOLOGY | Age: 75
End: 2020-02-04
Payer: MEDICARE

## 2020-02-04 VITALS — BODY MASS INDEX: 27.14 KG/M2 | SYSTOLIC BLOOD PRESSURE: 178 MMHG | WEIGHT: 121 LBS | DIASTOLIC BLOOD PRESSURE: 90 MMHG

## 2020-02-04 PROCEDURE — 99999 PR OFFICE/OUTPT VISIT,PROCEDURE ONLY: CPT | Performed by: INTERNAL MEDICINE

## 2020-02-04 PROCEDURE — 96372 THER/PROPH/DIAG INJ SC/IM: CPT | Performed by: INTERNAL MEDICINE

## 2020-02-04 NOTE — PROGRESS NOTES
After obtaining consent, and per orders of Dr. Héctor Philippe, injection of Nucala given in Right arm by Efe Hendrickson. Patient instructed to remain in clinic for 20 minutes afterwards, and to report any adverse reaction to me immediately. Patient supplied medication.

## 2020-03-03 ENCOUNTER — OFFICE VISIT (OUTPATIENT)
Dept: PULMONOLOGY | Age: 75
End: 2020-03-03
Payer: MEDICARE

## 2020-03-03 VITALS
SYSTOLIC BLOOD PRESSURE: 156 MMHG | WEIGHT: 123 LBS | BODY MASS INDEX: 27.59 KG/M2 | DIASTOLIC BLOOD PRESSURE: 98 MMHG | HEART RATE: 54 BPM | RESPIRATION RATE: 16 BRPM | OXYGEN SATURATION: 97 %

## 2020-03-03 PROCEDURE — 96372 THER/PROPH/DIAG INJ SC/IM: CPT | Performed by: INTERNAL MEDICINE

## 2020-03-03 NOTE — PROGRESS NOTES
After obtaining consent, and per orders of Dr. Fabby Hammond, injection of Nucala given in Left arm by Abhi Hernandez. Patient instructed to remain in clinic for 20 minutes afterwards, and to report any adverse reaction to me immediately.  Patient supplied

## 2020-03-03 NOTE — PATIENT INSTRUCTIONS
Called for refill- was told they can't ship or take order until 3/16/20 -  Denece Single will call pt on 3/16/20 and verify shipment. NO NEED for office to call for refills per Ophelia/pharmacy. lmom to inform Lianet.    LS

## 2020-04-07 ENCOUNTER — NURSE ONLY (OUTPATIENT)
Dept: PULMONOLOGY | Age: 75
End: 2020-04-07
Payer: MEDICARE

## 2020-04-07 VITALS
HEIGHT: 55 IN | BODY MASS INDEX: 28.56 KG/M2 | DIASTOLIC BLOOD PRESSURE: 116 MMHG | SYSTOLIC BLOOD PRESSURE: 191 MMHG | WEIGHT: 123.4 LBS

## 2020-04-07 PROCEDURE — 96372 THER/PROPH/DIAG INJ SC/IM: CPT | Performed by: NURSE PRACTITIONER

## 2020-04-24 RX ORDER — MONTELUKAST SODIUM 10 MG/1
TABLET ORAL
Qty: 90 TABLET | Refills: 1 | Status: SHIPPED | OUTPATIENT
Start: 2020-04-24 | End: 2020-10-21

## 2020-05-04 ENCOUNTER — NURSE ONLY (OUTPATIENT)
Dept: PULMONOLOGY | Age: 75
End: 2020-05-04
Payer: MEDICARE

## 2020-05-04 VITALS
SYSTOLIC BLOOD PRESSURE: 208 MMHG | BODY MASS INDEX: 27.22 KG/M2 | HEART RATE: 97 BPM | DIASTOLIC BLOOD PRESSURE: 99 MMHG | HEIGHT: 56 IN | WEIGHT: 121 LBS

## 2020-05-04 PROCEDURE — 96372 THER/PROPH/DIAG INJ SC/IM: CPT | Performed by: NURSE PRACTITIONER

## 2020-06-02 ENCOUNTER — OFFICE VISIT (OUTPATIENT)
Dept: PULMONOLOGY | Age: 75
End: 2020-06-02
Payer: MEDICARE

## 2020-06-02 VITALS
WEIGHT: 119 LBS | BODY MASS INDEX: 26.77 KG/M2 | OXYGEN SATURATION: 99 % | DIASTOLIC BLOOD PRESSURE: 102 MMHG | RESPIRATION RATE: 14 BRPM | HEIGHT: 56 IN | HEART RATE: 88 BPM | SYSTOLIC BLOOD PRESSURE: 184 MMHG

## 2020-06-02 PROCEDURE — G8427 DOCREV CUR MEDS BY ELIG CLIN: HCPCS | Performed by: INTERNAL MEDICINE

## 2020-06-02 PROCEDURE — 4040F PNEUMOC VAC/ADMIN/RCVD: CPT | Performed by: INTERNAL MEDICINE

## 2020-06-02 PROCEDURE — 1090F PRES/ABSN URINE INCON ASSESS: CPT | Performed by: INTERNAL MEDICINE

## 2020-06-02 PROCEDURE — 3017F COLORECTAL CA SCREEN DOC REV: CPT | Performed by: INTERNAL MEDICINE

## 2020-06-02 PROCEDURE — 99213 OFFICE O/P EST LOW 20 MIN: CPT | Performed by: INTERNAL MEDICINE

## 2020-06-02 PROCEDURE — 1123F ACP DISCUSS/DSCN MKR DOCD: CPT | Performed by: INTERNAL MEDICINE

## 2020-06-02 PROCEDURE — 1036F TOBACCO NON-USER: CPT | Performed by: INTERNAL MEDICINE

## 2020-06-02 PROCEDURE — G8417 CALC BMI ABV UP PARAM F/U: HCPCS | Performed by: INTERNAL MEDICINE

## 2020-06-02 PROCEDURE — G8399 PT W/DXA RESULTS DOCUMENT: HCPCS | Performed by: INTERNAL MEDICINE

## 2020-06-02 ASSESSMENT — ENCOUNTER SYMPTOMS
GASTROINTESTINAL NEGATIVE: 1
RESPIRATORY NEGATIVE: 1
EYES NEGATIVE: 1

## 2020-06-02 NOTE — PROGRESS NOTES
Subjective:      Patient ID: Rosa Albright is a 70 y.o. female. HPI  Follow-up visit for eosinophilic asthma. Since her last visit nearly 9 months ago asthma under excellent control. Remains on Nucala 100 mg monthly. No allergic reactions or other side effects. Also uses Dulera 200/5 Singulair 10 mg nightly and Ventolin HFA once or twice daily. No nocturnal or exercise-induced symptoms. Daily cough productive of mucoid phlegm. Denies hemoptysis. On Flonase for nasal allergies. Very pleased with asthma control. No signs or symptoms of COVID-19 illness. Review of Systems   Constitutional: Negative. HENT: Negative. Eyes: Negative. Respiratory: Negative. Cardiovascular: Negative. Gastrointestinal: Negative. All other systems reviewed and are negative. Objective:     Physical Exam  Vitals signs and nursing note reviewed. Constitutional:       Appearance: She is well-developed. HENT:      Head: Normocephalic. Eyes:      General: No scleral icterus. Conjunctiva/sclera: Conjunctivae normal.   Neck:      Musculoskeletal: Neck supple. Thyroid: No thyromegaly. Vascular: No JVD. Trachea: No tracheal deviation. Cardiovascular:      Rate and Rhythm: Normal rate and regular rhythm. Heart sounds: Normal heart sounds. No murmur. No gallop. Pulmonary:      Effort: Pulmonary effort is normal. No respiratory distress. Breath sounds: No wheezing or rales. Chest:      Chest wall: No tenderness. Abdominal:      Palpations: Abdomen is soft. Tenderness: There is no abdominal tenderness. Lymphadenopathy:      Cervical: No cervical adenopathy. Skin:     General: Skin is warm and dry. Neurological:      Mental Status: She is alert and oriented to person, place, and time.          Wt Readings from Last 3 Encounters:   06/02/20 119 lb (54 kg)   05/04/20 121 lb (54.9 kg)   04/07/20 123 lb 6.4 oz (56 kg)          Results for orders placed or performed during the hospital encounter of 04/30/19   IgE   Result Value Ref Range    IgE 53 <101 IU/mL   Eosinophil Count   Result Value Ref Range    WBC NOT REPORTED k/uL    Eosinophils % NOT REPORTED %    Eosinophils Absolute 523 (H) 200 - 400 /uL       Assessment:         1. Eosinophilic asthma ([address]    2. Severe persistent asthma, well-controlled    3. Essential hypertension           Plan:      1. Continue current asthma treatment. 2. Discussed strategies for management of asthma. 3. Continue Nucala. 4. Discussed strategies to mitigate COVID-19 risk. 5. Check blood pressure. Elevated levels suggest poor control. Recommend she follow-up with Dr. Dario Menendez. 6. Return in 6 months.      Electronically signed by Nat Hand DO on 6/2/2020at 9:59 AM

## 2020-06-08 ENCOUNTER — OFFICE VISIT (OUTPATIENT)
Dept: PULMONOLOGY | Age: 75
End: 2020-06-08
Payer: MEDICARE

## 2020-06-08 VITALS
WEIGHT: 118 LBS | SYSTOLIC BLOOD PRESSURE: 155 MMHG | TEMPERATURE: 97.2 F | DIASTOLIC BLOOD PRESSURE: 99 MMHG | HEART RATE: 64 BPM | OXYGEN SATURATION: 98 % | RESPIRATION RATE: 16 BRPM | BODY MASS INDEX: 26.54 KG/M2 | HEIGHT: 56 IN

## 2020-06-08 PROCEDURE — 96372 THER/PROPH/DIAG INJ SC/IM: CPT | Performed by: INTERNAL MEDICINE

## 2020-07-06 ENCOUNTER — NURSE ONLY (OUTPATIENT)
Dept: PULMONOLOGY | Age: 75
End: 2020-07-06
Payer: MEDICARE

## 2020-07-06 PROCEDURE — 96372 THER/PROPH/DIAG INJ SC/IM: CPT | Performed by: INTERNAL MEDICINE

## 2020-07-06 PROCEDURE — 99999 PR OFFICE/OUTPT VISIT,PROCEDURE ONLY: CPT | Performed by: NURSE PRACTITIONER

## 2020-07-06 RX ORDER — MEPOLIZUMAB 100 MG/ML
INJECTION, POWDER, FOR SOLUTION SUBCUTANEOUS
Qty: 1 EACH | Refills: 11 | Status: SHIPPED | OUTPATIENT
Start: 2020-07-06 | End: 2020-07-10

## 2020-07-06 NOTE — PROGRESS NOTES
After obtaining consent, and per orders of Dr. Jimi Pillai, injection of 10mL Nucala given in Left arm by Tonja Butler ([facility]). Patient instructed to remain in clinic for 20 minutes afterwards, and to report any adverse reaction to me immediately. Patient supplied medication.

## 2020-07-10 RX ORDER — MEPOLIZUMAB 100 MG/ML
INJECTION, POWDER, FOR SOLUTION SUBCUTANEOUS
Qty: 1 EACH | Refills: 12 | Status: SHIPPED | OUTPATIENT
Start: 2020-07-10 | End: 2022-02-15

## 2020-08-11 ENCOUNTER — NURSE ONLY (OUTPATIENT)
Dept: PULMONOLOGY | Age: 75
End: 2020-08-11
Payer: MEDICARE

## 2020-08-11 VITALS
WEIGHT: 118.2 LBS | BODY MASS INDEX: 26.59 KG/M2 | TEMPERATURE: 97.4 F | SYSTOLIC BLOOD PRESSURE: 178 MMHG | OXYGEN SATURATION: 99 % | HEIGHT: 56 IN | HEART RATE: 55 BPM | DIASTOLIC BLOOD PRESSURE: 99 MMHG | RESPIRATION RATE: 18 BRPM

## 2020-08-11 PROCEDURE — 99999 PR OFFICE/OUTPT VISIT,PROCEDURE ONLY: CPT | Performed by: NURSE PRACTITIONER

## 2020-08-11 PROCEDURE — 96372 THER/PROPH/DIAG INJ SC/IM: CPT | Performed by: NURSE PRACTITIONER

## 2020-08-11 NOTE — PROGRESS NOTES
After obtaining consent, and per orders of Dr. Renee Red, injection of Nucala given in Right arm by Bharath Keller. Patient instructed to remain in clinic for 20 minutes afterwards, and to report any adverse reaction to me immediately.     PATIENT SUPPLIED

## 2020-09-08 ENCOUNTER — OFFICE VISIT (OUTPATIENT)
Dept: PULMONOLOGY | Age: 75
End: 2020-09-08
Payer: MEDICARE

## 2020-09-08 VITALS
OXYGEN SATURATION: 98 % | HEIGHT: 56 IN | HEART RATE: 69 BPM | SYSTOLIC BLOOD PRESSURE: 171 MMHG | WEIGHT: 117.6 LBS | BODY MASS INDEX: 26.45 KG/M2 | TEMPERATURE: 97.4 F | DIASTOLIC BLOOD PRESSURE: 103 MMHG | RESPIRATION RATE: 20 BRPM

## 2020-09-08 PROCEDURE — 96372 THER/PROPH/DIAG INJ SC/IM: CPT | Performed by: INTERNAL MEDICINE

## 2020-10-13 ENCOUNTER — OFFICE VISIT (OUTPATIENT)
Dept: PULMONOLOGY | Age: 75
End: 2020-10-13
Payer: MEDICARE

## 2020-10-13 VITALS
OXYGEN SATURATION: 96 % | TEMPERATURE: 98.2 F | HEART RATE: 60 BPM | SYSTOLIC BLOOD PRESSURE: 180 MMHG | BODY MASS INDEX: 24.81 KG/M2 | RESPIRATION RATE: 18 BRPM | DIASTOLIC BLOOD PRESSURE: 92 MMHG | WEIGHT: 115 LBS | HEIGHT: 57 IN

## 2020-10-13 PROCEDURE — 96372 THER/PROPH/DIAG INJ SC/IM: CPT | Performed by: INTERNAL MEDICINE

## 2020-10-13 PROCEDURE — 99999 PR OFFICE/OUTPT VISIT,PROCEDURE ONLY: CPT | Performed by: INTERNAL MEDICINE

## 2020-10-13 NOTE — PROGRESS NOTES
After obtaining consent, and per orders of , injection of Nucala given in Left arm by Joby. Patient instructed to remain in clinic for 20 minutes afterwards, and to report any adverse reaction to me immediately.     PATIENT SUPPLIED -[facility]

## 2020-10-21 RX ORDER — MONTELUKAST SODIUM 10 MG/1
TABLET ORAL
Qty: 90 TABLET | Refills: 2 | Status: SHIPPED | OUTPATIENT
Start: 2020-10-21 | End: 2021-07-19

## 2020-11-03 ENCOUNTER — TELEPHONE (OUTPATIENT)
Dept: PULMONOLOGY | Age: 75
End: 2020-11-03

## 2020-11-10 ENCOUNTER — OFFICE VISIT (OUTPATIENT)
Dept: PULMONOLOGY | Age: 75
End: 2020-11-10
Payer: MEDICARE

## 2020-11-10 VITALS — DIASTOLIC BLOOD PRESSURE: 94 MMHG | WEIGHT: 117 LBS | SYSTOLIC BLOOD PRESSURE: 180 MMHG | BODY MASS INDEX: 25.32 KG/M2

## 2020-11-10 PROCEDURE — 96372 THER/PROPH/DIAG INJ SC/IM: CPT | Performed by: INTERNAL MEDICINE

## 2020-11-10 NOTE — PROGRESS NOTES
After obtaining consent, and per orders of Dr. Gardenia Cates, injection of Nucala 10ML given in Right arm by Consuelo Gallardo ([facility]). Patient instructed to remain in clinic for 20 minutes afterwards, and to report any adverse reaction to me immediately. Pt supplied medication.

## 2020-12-07 ENCOUNTER — OFFICE VISIT (OUTPATIENT)
Dept: PULMONOLOGY | Age: 75
End: 2020-12-07
Payer: MEDICARE

## 2020-12-07 VITALS
WEIGHT: 116.6 LBS | SYSTOLIC BLOOD PRESSURE: 179 MMHG | TEMPERATURE: 96.6 F | DIASTOLIC BLOOD PRESSURE: 93 MMHG | OXYGEN SATURATION: 96 % | HEART RATE: 56 BPM | RESPIRATION RATE: 18 BRPM | HEIGHT: 57 IN | BODY MASS INDEX: 25.16 KG/M2

## 2020-12-07 PROCEDURE — 96372 THER/PROPH/DIAG INJ SC/IM: CPT | Performed by: INTERNAL MEDICINE

## 2020-12-07 NOTE — PROGRESS NOTES
After obtaining consent, and per orders of Dr. Yunior Liang, injection of Nucala given in Left arm by Sonny Goodwin. Patient instructed to remain in clinic for 20 minutes afterwards, and to report any adverse reaction to me immediately.     **PATIENT SUPPLIED

## 2021-01-05 ENCOUNTER — OFFICE VISIT (OUTPATIENT)
Dept: PULMONOLOGY | Age: 76
End: 2021-01-05
Payer: MEDICARE

## 2021-01-05 ENCOUNTER — TELEPHONE (OUTPATIENT)
Dept: PULMONOLOGY | Age: 76
End: 2021-01-05

## 2021-01-05 VITALS — SYSTOLIC BLOOD PRESSURE: 183 MMHG | WEIGHT: 116 LBS | BODY MASS INDEX: 25.1 KG/M2 | DIASTOLIC BLOOD PRESSURE: 87 MMHG

## 2021-01-05 DIAGNOSIS — J45.50 SEVERE PERSISTENT ASTHMA WITHOUT COMPLICATION: Primary | ICD-10-CM

## 2021-01-05 PROCEDURE — 96372 THER/PROPH/DIAG INJ SC/IM: CPT | Performed by: INTERNAL MEDICINE

## 2021-01-05 NOTE — PROGRESS NOTES
After obtaining consent, and per orders of Dr. Dorie Waterman, injection of 10mL Nucala given in Left arm by Nirav Bautista ([facility]). Patient instructed to remain in clinic for 20 minutes afterwards, and to report any adverse reaction to me immediately.

## 2021-01-05 NOTE — TELEPHONE ENCOUNTER
SAKSHI VAZQUEZ Case ID: [id number] Need help? Call us at [phone]   Outcome   Approvedtoday   CaseId:[id number];Status:Approved; Review Type:Prior Auth; Coverage Start Date:12/06/2020; Coverage End Date:01/05/2022;

## 2021-02-02 ENCOUNTER — OFFICE VISIT (OUTPATIENT)
Dept: PULMONOLOGY | Age: 76
End: 2021-02-02
Payer: MEDICARE

## 2021-02-02 VITALS
BODY MASS INDEX: 25.97 KG/M2 | WEIGHT: 120 LBS | OXYGEN SATURATION: 97 % | DIASTOLIC BLOOD PRESSURE: 93 MMHG | HEART RATE: 64 BPM | SYSTOLIC BLOOD PRESSURE: 158 MMHG | TEMPERATURE: 96.4 F

## 2021-02-02 DIAGNOSIS — J45.50 SEVERE PERSISTENT ASTHMA, WELL-CONTROLLED: Primary | ICD-10-CM

## 2021-02-02 DIAGNOSIS — I10 ESSENTIAL HYPERTENSION: ICD-10-CM

## 2021-02-02 DIAGNOSIS — J45.52 ASTHMA, SEVERE PERSISTENT, POORLY-CONTROLLED, WITH STATUS ASTHMATICUS: ICD-10-CM

## 2021-02-02 DIAGNOSIS — J82.83 EOSINOPHILIC ASTHMA: ICD-10-CM

## 2021-02-02 PROCEDURE — G8399 PT W/DXA RESULTS DOCUMENT: HCPCS | Performed by: INTERNAL MEDICINE

## 2021-02-02 PROCEDURE — 96372 THER/PROPH/DIAG INJ SC/IM: CPT | Performed by: INTERNAL MEDICINE

## 2021-02-02 PROCEDURE — G8427 DOCREV CUR MEDS BY ELIG CLIN: HCPCS | Performed by: INTERNAL MEDICINE

## 2021-02-02 PROCEDURE — 1123F ACP DISCUSS/DSCN MKR DOCD: CPT | Performed by: INTERNAL MEDICINE

## 2021-02-02 PROCEDURE — 3017F COLORECTAL CA SCREEN DOC REV: CPT | Performed by: INTERNAL MEDICINE

## 2021-02-02 PROCEDURE — G8484 FLU IMMUNIZE NO ADMIN: HCPCS | Performed by: INTERNAL MEDICINE

## 2021-02-02 PROCEDURE — 1036F TOBACCO NON-USER: CPT | Performed by: INTERNAL MEDICINE

## 2021-02-02 PROCEDURE — 99213 OFFICE O/P EST LOW 20 MIN: CPT | Performed by: INTERNAL MEDICINE

## 2021-02-02 PROCEDURE — 1090F PRES/ABSN URINE INCON ASSESS: CPT | Performed by: INTERNAL MEDICINE

## 2021-02-02 PROCEDURE — 4040F PNEUMOC VAC/ADMIN/RCVD: CPT | Performed by: INTERNAL MEDICINE

## 2021-02-02 PROCEDURE — G8417 CALC BMI ABV UP PARAM F/U: HCPCS | Performed by: INTERNAL MEDICINE

## 2021-02-02 RX ORDER — ALBUTEROL SULFATE 90 UG/1
2 AEROSOL, METERED RESPIRATORY (INHALATION) EVERY 6 HOURS PRN
Qty: 1 INHALER | Refills: 11 | Status: SHIPPED | OUTPATIENT
Start: 2021-02-02 | End: 2022-10-03

## 2021-02-02 ASSESSMENT — ENCOUNTER SYMPTOMS
RESPIRATORY NEGATIVE: 1
GASTROINTESTINAL NEGATIVE: 1
EYES NEGATIVE: 1

## 2021-02-02 NOTE — PROGRESS NOTES
Subjective:      Patient ID: Gabriel Lorenzo is a 78 y.o. female. HPI  Follow-up visit for severe persistent asthma well controlled on Nucala. Rarely if ever uses albuterol. Uses Dulera and montelukast.  No exercise-induced or nocturnal symptoms. Received her first Covid vaccine last week. Review of Systems   Constitutional: Negative. HENT: Negative. Eyes: Negative. Respiratory: Negative. Cardiovascular: Negative. Gastrointestinal: Negative. All other systems reviewed and are negative. Objective:     Physical Exam  Vitals signs and nursing note reviewed. Constitutional:       Appearance: She is well-developed. HENT:      Head: Normocephalic. Eyes:      General: No scleral icterus. Conjunctiva/sclera: Conjunctivae normal.   Neck:      Musculoskeletal: Neck supple. Thyroid: No thyromegaly. Vascular: No JVD. Trachea: No tracheal deviation. Cardiovascular:      Rate and Rhythm: Normal rate and regular rhythm. Heart sounds: Normal heart sounds. No murmur. No gallop. Pulmonary:      Effort: Pulmonary effort is normal. No respiratory distress. Breath sounds: No wheezing or rales. Chest:      Chest wall: No tenderness. Abdominal:      Palpations: Abdomen is soft. Tenderness: There is no abdominal tenderness. Lymphadenopathy:      Cervical: No cervical adenopathy. Skin:     General: Skin is warm and dry. Neurological:      Mental Status: She is alert and oriented to person, place, and time.          Wt Readings from Last 3 Encounters:   02/02/21 120 lb (54.4 kg)   01/05/21 116 lb (52.6 kg)   12/07/20 116 lb 9.6 oz (52.9 kg)          Results for orders placed or performed during the hospital encounter of 04/30/19   IgE   Result Value Ref Range    IgE 53 <101 IU/mL   Eosinophil Count   Result Value Ref Range    WBC NOT REPORTED k/uL    Eosinophils % NOT REPORTED %    Eosinophils Absolute 523 (H) 200 - 400 /uL       Assessment: 1. Severe persistent asthma, well-controlled    2. Eosinophilic asthma    3. Essential hypertension    4. Asthma, severe persistent, poorly-controlled, with status asthmaticus          Plan:      1. Nucala 100 mg subcu today. 2. Refilled Ventolin HFA to have on hand for acute exacerbations. 3. Discussed strategies for management of asthma. 4. Discussed strategies to mitigate risk of COVID-19 infection. 5. Return in 6 months.      Electronically signed by Angela Haines DO on 2/2/2021at 9:26 AM

## 2021-03-01 ENCOUNTER — OFFICE VISIT (OUTPATIENT)
Dept: PULMONOLOGY | Age: 76
End: 2021-03-01
Payer: MEDICARE

## 2021-03-01 VITALS
HEART RATE: 52 BPM | RESPIRATION RATE: 14 BRPM | HEIGHT: 57 IN | SYSTOLIC BLOOD PRESSURE: 132 MMHG | TEMPERATURE: 97.3 F | WEIGHT: 116 LBS | DIASTOLIC BLOOD PRESSURE: 91 MMHG | OXYGEN SATURATION: 98 % | BODY MASS INDEX: 25.03 KG/M2

## 2021-03-01 DIAGNOSIS — J45.50 SEVERE PERSISTENT ASTHMA, WELL-CONTROLLED: ICD-10-CM

## 2021-03-01 DIAGNOSIS — J45.909 ASTHMA, UNSPECIFIED ASTHMA SEVERITY, UNSPECIFIED WHETHER COMPLICATED, UNSPECIFIED WHETHER PERSISTENT: Primary | ICD-10-CM

## 2021-03-01 PROCEDURE — 96372 THER/PROPH/DIAG INJ SC/IM: CPT | Performed by: INTERNAL MEDICINE

## 2021-03-01 NOTE — PROGRESS NOTES
After obtaining consent, and per orders of Dr. Jake Dandy, injection of Nucala given in Right arm by James Fondjose. Patient instructed to report any adverse reaction to me immediately.

## 2021-03-08 ENCOUNTER — TELEPHONE (OUTPATIENT)
Dept: PULMONOLOGY | Age: 76
End: 2021-03-08

## 2021-03-08 DIAGNOSIS — J45.51 SEVERE PERSISTENT ASTHMA WITH EXACERBATION: Primary | ICD-10-CM

## 2021-03-08 RX ORDER — AZITHROMYCIN 250 MG/1
250 TABLET, FILM COATED ORAL SEE ADMIN INSTRUCTIONS
Qty: 6 TABLET | Refills: 0 | Status: SHIPPED | OUTPATIENT
Start: 2021-03-08 | End: 2021-03-13

## 2021-03-08 RX ORDER — PREDNISONE 10 MG/1
40 TABLET ORAL DAILY
Qty: 20 TABLET | Refills: 0 | Status: SHIPPED | OUTPATIENT
Start: 2021-03-08 | End: 2021-03-13

## 2021-03-08 NOTE — TELEPHONE ENCOUNTER
Pt calling office requesting Rx - she has been coughing \"really really bad\" x 2 wks,  She can't sleep at night. Non productive - throat \"tickles\"  No other complaints.    She is asking for an AB

## 2021-03-16 DIAGNOSIS — J45.50 SEVERE PERSISTENT ASTHMA, UNSPECIFIED WHETHER COMPLICATED: ICD-10-CM

## 2021-03-16 RX ORDER — MOMETASONE FUROATE AND FORMOTEROL FUMARATE DIHYDRATE 200; 5 UG/1; UG/1
AEROSOL RESPIRATORY (INHALATION)
Qty: 3 INHALER | Refills: 3 | Status: SHIPPED | OUTPATIENT
Start: 2021-03-16 | End: 2022-03-11

## 2021-03-16 NOTE — TELEPHONE ENCOUNTER
Dr Elizabeth Pimentel, patient is current and is scheduled for follow up on 8/3/21. Per last dictation patient is on this medication. Please sign for refill if ok. Thank you.

## 2021-04-05 ENCOUNTER — OFFICE VISIT (OUTPATIENT)
Dept: PULMONOLOGY | Age: 76
End: 2021-04-05
Payer: MEDICARE

## 2021-04-05 VITALS — SYSTOLIC BLOOD PRESSURE: 174 MMHG | DIASTOLIC BLOOD PRESSURE: 83 MMHG

## 2021-04-05 DIAGNOSIS — J45.50 SEVERE PERSISTENT ASTHMA, UNSPECIFIED WHETHER COMPLICATED: Primary | ICD-10-CM

## 2021-04-05 PROCEDURE — 96372 THER/PROPH/DIAG INJ SC/IM: CPT | Performed by: INTERNAL MEDICINE

## 2021-04-05 RX ORDER — CROMOLYN SODIUM 40 MG/ML
SOLUTION/ DROPS OPHTHALMIC
COMMUNITY
Start: 2021-03-12

## 2021-04-05 RX ORDER — CROMOLYN SODIUM 40 MG/ML
SOLUTION/ DROPS OPHTHALMIC
COMMUNITY

## 2021-04-05 NOTE — PROGRESS NOTES
After obtaining consent, and per orders of Dr. Anitra Bryant, injection of Nucala given in Left arm by Yessica Pearl. Patient instructed to remain in clinic for 20 minutes afterwards, and to report any adverse reaction to me immediately.        PATIENT SUPPLIED

## 2021-05-03 ENCOUNTER — OFFICE VISIT (OUTPATIENT)
Dept: PULMONOLOGY | Age: 76
End: 2021-05-03
Payer: MEDICARE

## 2021-05-03 VITALS
WEIGHT: 120.6 LBS | OXYGEN SATURATION: 91 % | RESPIRATION RATE: 16 BRPM | DIASTOLIC BLOOD PRESSURE: 87 MMHG | TEMPERATURE: 97 F | SYSTOLIC BLOOD PRESSURE: 158 MMHG | HEIGHT: 56 IN | BODY MASS INDEX: 27.13 KG/M2 | HEART RATE: 91 BPM

## 2021-05-03 DIAGNOSIS — J45.50 SEVERE PERSISTENT ASTHMA, UNSPECIFIED WHETHER COMPLICATED: Primary | ICD-10-CM

## 2021-05-03 PROCEDURE — 96372 THER/PROPH/DIAG INJ SC/IM: CPT | Performed by: INTERNAL MEDICINE

## 2021-05-03 PROCEDURE — 99999 PR OFFICE/OUTPT VISIT,PROCEDURE ONLY: CPT | Performed by: INTERNAL MEDICINE

## 2021-05-03 NOTE — PROGRESS NOTES
After obtaining consent, and per orders of Dr. Patito Jarrell, injection of Nucala given in Left arm by Timur Castillo. Patient instructed to remain in clinic for 20 minutes afterwards, and to report any adverse reaction to me immediately.     \"patient supplied\"

## 2021-05-18 NOTE — PROGRESS NOTES
Subjective:      Patient ID: Robbie Javier is a 81 y.o. female. HPI  Asthma under good control. Denies cough wheezing or shortness of breath. Uses albuterol inhaler about once a month. Needs refills. No nocturnal or exercise-induced symptoms. Review of Systems   Constitutional: Negative. HENT: Negative. Eyes: Negative. Respiratory: Negative. Cardiovascular: Negative. Gastrointestinal: Negative. All other systems reviewed and are negative. Objective:     Physical Exam   Constitutional: She is oriented to person, place, and time. She appears well-developed and well-nourished. HENT:   Head: Normocephalic. Mouth/Throat: Oropharynx is clear and moist. No oropharyngeal exudate. Eyes: Conjunctivae are normal. No scleral icterus. Neck: Neck supple. No JVD present. No tracheal deviation present. No thyromegaly present. Cardiovascular: Normal rate, regular rhythm and normal heart sounds. Exam reveals no gallop. No murmur heard. Pulmonary/Chest: Effort normal. No respiratory distress. She has no wheezes. She has no rales. She exhibits no tenderness. Abdominal: Soft. There is no tenderness. Musculoskeletal: She exhibits no edema. Lymphadenopathy:     She has no cervical adenopathy. Neurological: She is alert and oriented to person, place, and time. Skin: Skin is warm and dry. Nursing note and vitals reviewed.       Wt Readings from Last 3 Encounters:   10/16/17 117 lb (53.1 kg)   05/15/17 122 lb (55.3 kg)   02/20/17 122 lb 9.6 oz (55.6 kg)       Results for orders placed or performed during the hospital encounter of 09/04/14   Urine Culture   Result Value Ref Range    Specimen       CLEAN CATCH URINE Performed at [facility] [address],    Specimen  [address] [facility] [phone]     Special Requests NOT REPORTED     Culture NO SIGNIFICANT GROWTH     Culture       Performed at [employer] [address] [facility], [address] [place] [address] [phone] Status FINAL 09/06/2014    Amylase   Result Value Ref Range    Amylase 44 28 - 100 U/L   Basic Metabolic Panel   Result Value Ref Range    Glucose 107 (H) 70 - 99 mg/dL    BUN 11 8 - 23 mg/dL    CREATININE <0.40 (L) 0.50 - 0.90 mg/dL    Bun/Cre Ratio CANNOT BE CALCULATED 9 - 20    Calcium 8.9 8.6 - 10.0 mg/dL    Sodium 125 (L) 133 - 142 mmol/L    Potassium 3.0 (L) 3.5 - 5.1 mmol/L    Chloride 88 (L) 98 - 107 mmol/L    CO2 25 20 - 31 mmol/L    Anion Gap 15 mmol/L    GFR Non- CANNOT BE CALCULATED >60 mL/min    GFR  CANNOT BE CALCULATED >60 mL/min    GFR Comment          GFR Staging NOT REPORTED    CBC Auto Differential   Result Value Ref Range    WBC 7.9 3.5 - 11.0 k/uL    RBC 4.57 4.0 - 5.2 m/uL    Hemoglobin 13.2 12.0 - 16.0 g/dL    Hematocrit 38.9 36 - 46 %    MCV 85.1 80 - 100 fL    MCH 28.8 26 - 34 pg    MCHC 33.8 31 - 37 g/dL    RDW 13.7 11.5 - 14.5 %    Platelets 381 390 - 396 k/uL    MPV 8.9 6.0 - 12.0 fL    Differential Type NOT REPORTED     WBC Morphology NOT REPORTED     RBC Morphology NOT REPORTED     Platelet Estimate NOT REPORTED     Seg Neutrophils 63 36 - 66 %    Lymphocytes 27 24 - 44 %    Monocytes 5 1 - 7 %    Eosinophils % 4 1 - 4 %    Basophils 1 0 - 2 %    Segs Absolute 5.08 1.8 - 7.7 k/uL    Absolute Lymph # 2.14 1.0 - 4.8 k/uL    Absolute Mono # 0.36 0.2 - 0.8 k/uL    Absolute Eos # 0.30 0.0 - 0.4 k/uL    Basophils # 0.04 0.0 - 0.2 k/uL   Hepatic Function Panel   Result Value Ref Range    Alb 3.9 3.5 - 5.2 g/dL    Alkaline Phosphatase 59 35 - 104 U/L    ALT 8 5 - 33 U/L    AST 26 <32 U/L    Total Bilirubin 0.70 0.3 - 1.2 mg/dL    Bilirubin, Direct 0.16 <0.31 mg/dL    Bilirubin, Indirect 0.54 0.00 - 1.00 mg/dL    Total Protein 7.2 6.4 - 8.3 g/dL    Globulin NOT REPORTED 1.5 - 3.8 g/dL    Albumin/Globulin Ratio NOT REPORTED 1.7 - 2.5   Lipase   Result Value Ref Range    Lipase 16 13 - 60 U/L   Urinalysis   Result Value Ref Range    Color, UA STRAW (A) YASMIN Turbidity UA CLEAR CLEAR    Glucose, Ur NEGATIVE NEG    Bilirubin Urine NEGATIVE NEG    Ketones, Urine NEGATIVE NEG    Specific Gravity, UA 1.015 1.005 - 1.030    Urine Hgb 2+ (A) NEG    pH, UA 7.5 5.0 - 8.0    Protein, UA NEGATIVE NEG    Urobilinogen, Urine Normal NORM    Nitrite, Urine NEGATIVE NEG    Leukocyte Esterase, Urine TRACE (A) NEG    Urinalysis Comments NOT REPORTED    Magnesium   Result Value Ref Range    Magnesium 1.6 1.6 - 2.6 mg/dL   Microscopic Urinalysis   Result Value Ref Range    -          WBC, UA 0 TO 2 0 - 5 /HPF    RBC, UA 5 TO 10 0 - 2 /HPF    Casts UA NOT REPORTED 0 - 2 /LPF    Crystals UA NOT REPORTED NONE /HPF    Epithelial Cells UA FEW /HPF    Renal Epithelial, Urine NOT REPORTED 0 /HPF    Bacteria, UA FEW (A) NONE    Mucus, UA NOT REPORTED NONE    Trichomonas, UA NOT REPORTED NONE    Amorphous, UA 1+ (A) NONE    Other Observations UA NOT REPORTED NREQ    Yeast, UA NOT REPORTED NONE       Assessment:      1. Asthma, severe persistent, well-controlled          Plan:      1.  continue current asthma treatment. 2. Encouraged flu shot. 3. Return in one year.       Electronically signed by Tavon Lucio DO on 10/16/2017 at 11:26 PM Propranolol Counseling:  I discussed with the patient the risks of propranolol including but not limited to low heart rate, low blood pressure, low blood sugar, restlessness and increased cold sensitivity. They should call the office if they experience any of these side effects.

## 2021-06-10 ENCOUNTER — OFFICE VISIT (OUTPATIENT)
Dept: PULMONOLOGY | Age: 76
End: 2021-06-10
Payer: MEDICARE

## 2021-06-10 VITALS
WEIGHT: 117 LBS | TEMPERATURE: 96.8 F | SYSTOLIC BLOOD PRESSURE: 135 MMHG | OXYGEN SATURATION: 98 % | DIASTOLIC BLOOD PRESSURE: 86 MMHG | HEIGHT: 56 IN | BODY MASS INDEX: 26.32 KG/M2 | HEART RATE: 51 BPM

## 2021-06-10 DIAGNOSIS — J45.50 SEVERE PERSISTENT ASTHMA, UNSPECIFIED WHETHER COMPLICATED: Primary | ICD-10-CM

## 2021-06-10 PROCEDURE — 96372 THER/PROPH/DIAG INJ SC/IM: CPT | Performed by: INTERNAL MEDICINE

## 2021-06-10 NOTE — PROGRESS NOTES
After obtaining consent, and per orders of Dr. Courtney Tate, injection of Nucala given in Left arm by SILVESTRE Hemphill. Patient instructed to remain in clinic for 20 minutes afterwards, and to report any adverse reaction to me immediately.     **PATIENT SUPPLIED

## 2021-07-15 ENCOUNTER — OFFICE VISIT (OUTPATIENT)
Dept: PULMONOLOGY | Age: 76
End: 2021-07-15
Payer: MEDICARE

## 2021-07-15 VITALS
BODY MASS INDEX: 27.04 KG/M2 | RESPIRATION RATE: 20 BRPM | WEIGHT: 120.2 LBS | HEART RATE: 50 BPM | OXYGEN SATURATION: 98 % | TEMPERATURE: 97.2 F | DIASTOLIC BLOOD PRESSURE: 85 MMHG | SYSTOLIC BLOOD PRESSURE: 133 MMHG | HEIGHT: 56 IN

## 2021-07-15 DIAGNOSIS — J45.50 SEVERE PERSISTENT ASTHMA, UNSPECIFIED WHETHER COMPLICATED: Primary | ICD-10-CM

## 2021-07-15 PROCEDURE — 96372 THER/PROPH/DIAG INJ SC/IM: CPT | Performed by: INTERNAL MEDICINE

## 2021-07-15 NOTE — PROGRESS NOTES
After obtaining consent, and per orders of Dr. Pranav Mcguire, injection of jaswinder given in Left arm by Dayton Tejeda MA. Patient instructed to remain in clinic for 20 minutes afterwards, and to report any adverse reaction to me immediately.     Patient supplied medication

## 2021-07-19 DIAGNOSIS — J45.52 ASTHMA, SEVERE PERSISTENT, POORLY-CONTROLLED, WITH STATUS ASTHMATICUS: ICD-10-CM

## 2021-07-19 RX ORDER — MONTELUKAST SODIUM 10 MG/1
TABLET ORAL
Qty: 90 TABLET | Refills: 1 | Status: SHIPPED | OUTPATIENT
Start: 2021-07-19 | End: 2022-01-14

## 2021-08-10 ENCOUNTER — OFFICE VISIT (OUTPATIENT)
Dept: PULMONOLOGY | Age: 76
End: 2021-08-10
Payer: MEDICARE

## 2021-08-10 VITALS
HEART RATE: 52 BPM | BODY MASS INDEX: 26.32 KG/M2 | OXYGEN SATURATION: 98 % | SYSTOLIC BLOOD PRESSURE: 152 MMHG | WEIGHT: 117 LBS | DIASTOLIC BLOOD PRESSURE: 84 MMHG | HEIGHT: 56 IN | RESPIRATION RATE: 10 BRPM

## 2021-08-10 DIAGNOSIS — J45.52 ASTHMA, SEVERE PERSISTENT, POORLY-CONTROLLED, WITH STATUS ASTHMATICUS: Primary | ICD-10-CM

## 2021-08-10 DIAGNOSIS — J82.83 EOSINOPHILIC ASTHMA: ICD-10-CM

## 2021-08-10 DIAGNOSIS — J45.50 SEVERE PERSISTENT ASTHMA, WELL-CONTROLLED: ICD-10-CM

## 2021-08-10 PROCEDURE — G8399 PT W/DXA RESULTS DOCUMENT: HCPCS | Performed by: INTERNAL MEDICINE

## 2021-08-10 PROCEDURE — 3017F COLORECTAL CA SCREEN DOC REV: CPT | Performed by: INTERNAL MEDICINE

## 2021-08-10 PROCEDURE — 1036F TOBACCO NON-USER: CPT | Performed by: INTERNAL MEDICINE

## 2021-08-10 PROCEDURE — 1123F ACP DISCUSS/DSCN MKR DOCD: CPT | Performed by: INTERNAL MEDICINE

## 2021-08-10 PROCEDURE — 96372 THER/PROPH/DIAG INJ SC/IM: CPT | Performed by: INTERNAL MEDICINE

## 2021-08-10 PROCEDURE — 4040F PNEUMOC VAC/ADMIN/RCVD: CPT | Performed by: INTERNAL MEDICINE

## 2021-08-10 PROCEDURE — 99213 OFFICE O/P EST LOW 20 MIN: CPT | Performed by: INTERNAL MEDICINE

## 2021-08-10 PROCEDURE — G8417 CALC BMI ABV UP PARAM F/U: HCPCS | Performed by: INTERNAL MEDICINE

## 2021-08-10 PROCEDURE — G8427 DOCREV CUR MEDS BY ELIG CLIN: HCPCS | Performed by: INTERNAL MEDICINE

## 2021-08-10 PROCEDURE — 1090F PRES/ABSN URINE INCON ASSESS: CPT | Performed by: INTERNAL MEDICINE

## 2021-08-10 ASSESSMENT — ENCOUNTER SYMPTOMS
GASTROINTESTINAL NEGATIVE: 1
RESPIRATORY NEGATIVE: 1
EYES NEGATIVE: 1

## 2021-08-10 NOTE — PROGRESS NOTES
Subjective:      Patient ID: Bharti Thompson is a 78 y.o. female being seen in my clinic for   Chief Complaint   Patient presents with    Asthma       HPI  Follow-up visit for eosinophilic asthma. Since her last visit 6 months ago she has been nearly asymptomatic. Uses albuterol twice daily although its not clear whether this is out of need or habit. Remains on Dulera 2005 and montelukast 10 mg nightly. Monthly Nucala 100 mg subcu. No allergic reaction. Fully vaccinated for Covid. No emergency rooms visits. Review of Systems   Constitutional: Negative. HENT: Negative. Eyes: Negative. Respiratory: Negative. Cardiovascular: Negative. Gastrointestinal: Negative. All other systems reviewed and are negative.       Objective:     Vitals:    08/10/21 0941   BP: (!) 152/84   Site: Left Upper Arm   Position: Sitting   Cuff Size: Medium Adult   Pulse: 52   Resp: 10   SpO2: 98%  Comment: Room air   Weight: 117 lb (53.1 kg)   Height: 4' 8\" (1.422 m)     Current Outpatient Medications   Medication Sig Dispense Refill    mepolizumab (NUCALA) 100 MG SOLR injection Inject 1 mL into the skin once for 1 dose 1 mL 0    montelukast (SINGULAIR) 10 MG tablet TAKE 1 TABLET NIGHTLY 90 tablet 1    cromolyn (OPTICROM) 4 % ophthalmic solution       cromolyn (OPTICROM) 4 % ophthalmic solution cromolyn 4 % eye drops   INSTILL 1 DROP INTO AFFECTED EYE(S) FOUR TIMES A DAY      DULERA 200-5 MCG/ACT inhaler USE 2 INHALATIONS EVERY 12 HOURS 3 Inhaler 3    albuterol sulfate HFA (VENTOLIN HFA) 108 (90 Base) MCG/ACT inhaler Inhale 2 puffs into the lungs every 6 hours as needed for Wheezing 1 Inhaler 11    NUCALA 100 MG SOLR injection INJECT 100 MG UNDER THE SKIN EVERY FOUR WEEKS 1 each 12    ibuprofen (IBU) 600 MG tablet Take 1 tablet by mouth every 8 hours as needed for Pain 20 tablet 0    EPINEPHrine (EPIPEN 2-JESSICA) 0.3 MG/0.3ML SOAJ injection Inject 0.3 mLs into the muscle as needed (Patient to bring each time she gets a Nucala injection.) Use as directed for allergic reaction 2 each 0    losartan (COZAAR) 50 MG tablet Take 50 mg by mouth daily      fluticasone (FLONASE) 50 MCG/ACT nasal spray 1 spray by Nasal route daily      Cetirizine HCl 10 MG CAPS Take by mouth      ferrous sulfate 325 (65 FE) MG tablet Take 325 mg by mouth daily (with breakfast)      potassium chloride (KLOR-CON M) 20 MEQ extended release tablet TAKE 1 TABLET TWICE A DAY      aspirin 81 MG chewable tablet Take 1 tablet by mouth daily      metoprolol succinate (TOPROL XL) 100 MG extended release tablet Take 1 tablet by mouth daily      traMADol (ULTRAM) 50 MG tablet Take 1 tablet by mouth daily      hydrochlorothiazide (MICROZIDE) 12.5 MG capsule Take  by mouth daily.  albuterol (PROVENTIL) (2.5 MG/3ML) 0.083% nebulizer solution Take 1.25 mg by nebulization every 6 hours as needed for Wheezing       mepolizumab (NUCALA) 100 MG SOLR injection Inject 1 mL into the skin once for 1 dose 1 mL 0    mepolizumab (NUCALA) 100 MG SOLR injection Inject 1 mL into the skin once for 1 dose 1 each 11     No current facility-administered medications for this visit. Physical Exam  Vitals and nursing note reviewed. Constitutional:       Appearance: She is well-developed. HENT:      Head: Normocephalic. Nose: Nose normal. No congestion. Mouth/Throat:      Mouth: Mucous membranes are moist.      Pharynx: Oropharynx is clear. No oropharyngeal exudate. Eyes:      General: No scleral icterus. Conjunctiva/sclera: Conjunctivae normal.   Neck:      Thyroid: No thyromegaly. Vascular: No JVD. Trachea: No tracheal deviation. Cardiovascular:      Rate and Rhythm: Normal rate and regular rhythm. Heart sounds: Normal heart sounds. No murmur heard. No gallop. Pulmonary:      Effort: Pulmonary effort is normal. No respiratory distress. Breath sounds: No wheezing or rales. Chest:      Chest wall: No tenderness. Abdominal:      Palpations: Abdomen is soft. Tenderness: There is no abdominal tenderness. Musculoskeletal:      Cervical back: Neck supple. Lymphadenopathy:      Cervical: No cervical adenopathy. Skin:     General: Skin is warm and dry. Neurological:      Mental Status: She is alert and oriented to person, place, and time. Wt Readings from Last 3 Encounters:   08/10/21 117 lb (53.1 kg)   07/15/21 120 lb 3.2 oz (54.5 kg)   07/08/21 117 lb 3.2 oz (53.2 kg)     Results for orders placed or performed during the hospital encounter of 04/30/19   IgE   Result Value Ref Range    IgE 53 <101 IU/mL   Eosinophil Count   Result Value Ref Range    WBC NOT REPORTED k/uL    Eosinophils % NOT REPORTED %    Eosinophils Absolute 523 (H) 200 - 400 /uL       :      1. Asthma, severe persistent, poorly-controlled, with status asthmaticus    2. Severe persistent asthma, well-controlled    3. Eosinophilic asthma      There is no problem list on file for this patient. Plan:      1. Continue Nucala. 2. Discussed strategies for management of asthma. 3. Yearly flu shot. 4. Avoid environmental triggers. 5. Return in 6 months. Orders Placed This Encounter   Medications    mepolizumab (NUCALA) 100 MG SOLR injection     Sig: Inject 1 mL into the skin once for 1 dose     Dispense:  1 mL     Refill:  0    mepolizumab (NUCALA) injection 100 mg     No orders of the defined types were placed in this encounter. Return in about 6 months (around 2/10/2022).        Electronically signed by Marco Ritter DO on 8/10/2021at 10:01 AM

## 2021-08-10 NOTE — PROGRESS NOTES
After obtaining consent, and per orders of Dr. Cynthia Hunter, injection of 1 ml Nucala given in Left arm by Livier Jones CMA ([facility]). Patient instructed to remain in clinic for 20 minutes afterwards, and to report any adverse reaction to me immediately. Pt supplied medication.

## 2021-09-07 ENCOUNTER — OFFICE VISIT (OUTPATIENT)
Dept: PULMONOLOGY | Age: 76
End: 2021-09-07
Payer: MEDICARE

## 2021-09-07 VITALS
RESPIRATION RATE: 18 BRPM | HEIGHT: 56 IN | SYSTOLIC BLOOD PRESSURE: 153 MMHG | DIASTOLIC BLOOD PRESSURE: 88 MMHG | BODY MASS INDEX: 26.5 KG/M2 | TEMPERATURE: 98.2 F | OXYGEN SATURATION: 100 % | HEART RATE: 55 BPM | WEIGHT: 117.8 LBS

## 2021-09-07 DIAGNOSIS — J45.52 ASTHMA, SEVERE PERSISTENT, POORLY-CONTROLLED, WITH STATUS ASTHMATICUS: Primary | ICD-10-CM

## 2021-09-07 PROCEDURE — 96372 THER/PROPH/DIAG INJ SC/IM: CPT | Performed by: INTERNAL MEDICINE

## 2021-10-05 ENCOUNTER — OFFICE VISIT (OUTPATIENT)
Dept: PULMONOLOGY | Age: 76
End: 2021-10-05
Payer: MEDICARE

## 2021-10-05 VITALS
TEMPERATURE: 97.8 F | SYSTOLIC BLOOD PRESSURE: 149 MMHG | DIASTOLIC BLOOD PRESSURE: 80 MMHG | WEIGHT: 117 LBS | BODY MASS INDEX: 26.32 KG/M2 | RESPIRATION RATE: 16 BRPM | OXYGEN SATURATION: 100 % | HEIGHT: 56 IN | HEART RATE: 58 BPM

## 2021-10-05 DIAGNOSIS — J45.52 ASTHMA, SEVERE PERSISTENT, POORLY-CONTROLLED, WITH STATUS ASTHMATICUS: Primary | ICD-10-CM

## 2021-10-05 PROCEDURE — 96372 THER/PROPH/DIAG INJ SC/IM: CPT | Performed by: INTERNAL MEDICINE

## 2021-10-05 NOTE — PROGRESS NOTES
After obtaining consent, and per orders of Dr. Silver Ip, injection of Isabel Kells given in Right arm by SILVESTRE Hemphill. Patient instructed to remain in clinic for 20 minutes afterwards, and to report any adverse reaction to me immediately.     PATIENT SUPPLIED

## 2021-11-02 ENCOUNTER — TELEPHONE (OUTPATIENT)
Dept: PULMONOLOGY | Age: 76
End: 2021-11-02

## 2021-11-02 NOTE — TELEPHONE ENCOUNTER
The patient came into the office to get Nucala injection medication was not in the office. I called the specialty pharmacy for shipment on 11-3-21. I spoke to Cesario Riojas at specialty pharmacy gave a verbal for nucala 3 month supply with 3 refills. The patient already informed.

## 2021-11-11 ENCOUNTER — OFFICE VISIT (OUTPATIENT)
Dept: PULMONOLOGY | Age: 76
End: 2021-11-11
Payer: MEDICARE

## 2021-11-11 VITALS
TEMPERATURE: 97.2 F | BODY MASS INDEX: 26.54 KG/M2 | RESPIRATION RATE: 16 BRPM | HEIGHT: 56 IN | SYSTOLIC BLOOD PRESSURE: 160 MMHG | DIASTOLIC BLOOD PRESSURE: 80 MMHG | HEART RATE: 72 BPM | OXYGEN SATURATION: 94 % | WEIGHT: 118 LBS

## 2021-11-11 DIAGNOSIS — J45.909 SEVERE ASTHMA, UNSPECIFIED WHETHER COMPLICATED, UNSPECIFIED WHETHER PERSISTENT: Primary | ICD-10-CM

## 2021-11-11 DIAGNOSIS — J45.909 MILD ASTHMA, UNSPECIFIED WHETHER COMPLICATED, UNSPECIFIED WHETHER PERSISTENT: ICD-10-CM

## 2021-11-11 PROCEDURE — 96372 THER/PROPH/DIAG INJ SC/IM: CPT | Performed by: INTERNAL MEDICINE

## 2021-11-11 NOTE — PROGRESS NOTES
After obtaining consent, and per orders of Dr. Rory Brambila, injection of Nucala given in Right arm by SILVESTRE Hemphill. Patient instructed to remain in clinic for 20 minutes afterwards, and to report any adverse reaction to me immediately.     PATIENT SUPPLIED

## 2021-11-22 ENCOUNTER — TELEPHONE (OUTPATIENT)
Dept: PULMONOLOGY | Age: 76
End: 2021-11-22

## 2021-11-22 NOTE — TELEPHONE ENCOUNTER
Nucala injection was documented as a refill. [facility] called to clarify single dose Rx   Rx is about to  - clarified rx and added refills.

## 2021-12-07 ENCOUNTER — OFFICE VISIT (OUTPATIENT)
Dept: PULMONOLOGY | Age: 76
End: 2021-12-07
Payer: MEDICARE

## 2021-12-07 ENCOUNTER — TELEPHONE (OUTPATIENT)
Dept: PULMONOLOGY | Age: 76
End: 2021-12-07

## 2021-12-07 VITALS
WEIGHT: 120.32 LBS | SYSTOLIC BLOOD PRESSURE: 174 MMHG | BODY MASS INDEX: 27.07 KG/M2 | OXYGEN SATURATION: 98 % | HEART RATE: 52 BPM | DIASTOLIC BLOOD PRESSURE: 87 MMHG | RESPIRATION RATE: 18 BRPM | TEMPERATURE: 98.2 F | HEIGHT: 56 IN

## 2021-12-07 DIAGNOSIS — J45.909 MILD ASTHMA, UNSPECIFIED WHETHER COMPLICATED, UNSPECIFIED WHETHER PERSISTENT: Primary | ICD-10-CM

## 2021-12-07 PROCEDURE — 96372 THER/PROPH/DIAG INJ SC/IM: CPT | Performed by: INTERNAL MEDICINE

## 2021-12-07 NOTE — PROGRESS NOTES
After obtaining consent, and per orders of Dr. Luzma Velez, injection of Nucala given in Left arm by SILVESTRE Hemphill. Patient instructed to remain in clinic for 20 minutes afterwards, and to report any adverse reaction to me immediately.     PATIENT SUPPLIED

## 2021-12-21 ENCOUNTER — TELEPHONE (OUTPATIENT)
Dept: PULMONOLOGY | Age: 76
End: 2021-12-21

## 2021-12-21 DIAGNOSIS — J45.51 SEVERE PERSISTENT ASTHMA WITH EXACERBATION: Primary | ICD-10-CM

## 2021-12-21 NOTE — TELEPHONE ENCOUNTER
Pt has a productive cough and states she isnt short of breath but would love for something to be sent to the pharmacy for her to treat this. Could you please advise.  Libertad Cortes

## 2021-12-22 RX ORDER — AZITHROMYCIN 500 MG/1
500 TABLET, FILM COATED ORAL DAILY
Qty: 1 PACKET | Refills: 0 | Status: SHIPPED | OUTPATIENT
Start: 2021-12-22 | End: 2021-12-25

## 2022-01-11 ENCOUNTER — NURSE ONLY (OUTPATIENT)
Dept: PULMONOLOGY | Age: 77
End: 2022-01-11
Payer: MEDICARE

## 2022-01-11 VITALS
RESPIRATION RATE: 18 BRPM | HEIGHT: 56 IN | WEIGHT: 120 LBS | TEMPERATURE: 96.7 F | BODY MASS INDEX: 26.99 KG/M2 | OXYGEN SATURATION: 98 % | HEART RATE: 55 BPM | DIASTOLIC BLOOD PRESSURE: 95 MMHG | SYSTOLIC BLOOD PRESSURE: 185 MMHG

## 2022-01-11 DIAGNOSIS — J45.50 SEVERE PERSISTENT ASTHMA, WELL-CONTROLLED: Primary | ICD-10-CM

## 2022-01-11 PROCEDURE — 96372 THER/PROPH/DIAG INJ SC/IM: CPT | Performed by: NURSE PRACTITIONER

## 2022-01-11 NOTE — PROGRESS NOTES
After obtaining consent, and per orders of Dr. MALCOLM Roblero, injection of Hildred Cava given in Left arm by SILVESTRE Hemphill. Patient instructed to remain in clinic for 20 minutes afterwards, and to report any adverse reaction to me immediately.     PATIENT SUPPLIED

## 2022-01-14 DIAGNOSIS — J45.52 ASTHMA, SEVERE PERSISTENT, POORLY-CONTROLLED, WITH STATUS ASTHMATICUS: ICD-10-CM

## 2022-01-14 RX ORDER — MONTELUKAST SODIUM 10 MG/1
TABLET ORAL
Qty: 90 TABLET | Refills: 1 | Status: SHIPPED | OUTPATIENT
Start: 2022-01-14 | End: 2022-07-13

## 2022-01-14 NOTE — TELEPHONE ENCOUNTER
Dr Alissa Villalpando, patient is current and is scheduled for follow up on 4/4/22. Per last dictation patient is on this medication. Please sign for refill if ok. Thank you.

## 2022-02-15 ENCOUNTER — OFFICE VISIT (OUTPATIENT)
Dept: PULMONOLOGY | Age: 77
End: 2022-02-15
Payer: MEDICARE

## 2022-02-15 VITALS — WEIGHT: 123 LBS | SYSTOLIC BLOOD PRESSURE: 156 MMHG | DIASTOLIC BLOOD PRESSURE: 90 MMHG | BODY MASS INDEX: 27.58 KG/M2

## 2022-02-15 DIAGNOSIS — J45.50 SEVERE PERSISTENT ASTHMA, UNSPECIFIED WHETHER COMPLICATED: Primary | ICD-10-CM

## 2022-02-15 PROCEDURE — 96372 THER/PROPH/DIAG INJ SC/IM: CPT | Performed by: INTERNAL MEDICINE

## 2022-02-15 NOTE — PROGRESS NOTES
After obtaining consent, and per orders of Dr. Seema Trevino, injection of Nucala 100mg given in Right arm by Jamari Gage CMA ([facility]). Patient instructed to remain in clinic for 20 minutes afterwards, and to report any adverse reaction to me immediately. Patient supplied medication.

## 2022-03-11 DIAGNOSIS — J45.50 SEVERE PERSISTENT ASTHMA, UNSPECIFIED WHETHER COMPLICATED: ICD-10-CM

## 2022-03-11 RX ORDER — MOMETASONE FUROATE AND FORMOTEROL FUMARATE DIHYDRATE 200; 5 UG/1; UG/1
AEROSOL RESPIRATORY (INHALATION)
Qty: 3 EACH | Refills: 1 | Status: SHIPPED | OUTPATIENT
Start: 2022-03-11 | End: 2022-09-07

## 2022-03-11 NOTE — TELEPHONE ENCOUNTER
LAST VISIT: 8/10/21  NEXT VISIT: 4/4/22    Per last dictation patient is on this medication. Please sign for refill if ok. Thank you.

## 2022-03-15 ENCOUNTER — OFFICE VISIT (OUTPATIENT)
Dept: PULMONOLOGY | Age: 77
End: 2022-03-15
Payer: MEDICARE

## 2022-03-15 VITALS
WEIGHT: 124.4 LBS | SYSTOLIC BLOOD PRESSURE: 193 MMHG | BODY MASS INDEX: 27.89 KG/M2 | TEMPERATURE: 97 F | OXYGEN SATURATION: 97 % | DIASTOLIC BLOOD PRESSURE: 95 MMHG

## 2022-03-15 DIAGNOSIS — J45.50 SEVERE PERSISTENT ASTHMA, UNSPECIFIED WHETHER COMPLICATED: Primary | ICD-10-CM

## 2022-03-15 PROCEDURE — 96372 THER/PROPH/DIAG INJ SC/IM: CPT | Performed by: INTERNAL MEDICINE

## 2022-03-15 NOTE — PROGRESS NOTES
After obtaining consent, and per orders of Dr. Jackelyn Cordero, injection of Nucala given in right arm  by Alysha Bautista MA. Patient instructed to remain in clinic for 20 minutes afterwards, and to report any adverse reaction to me immediately.   Patient supplied

## 2022-04-04 ENCOUNTER — OFFICE VISIT (OUTPATIENT)
Dept: PULMONOLOGY | Age: 77
End: 2022-04-04
Payer: MEDICARE

## 2022-04-04 VITALS
HEART RATE: 57 BPM | DIASTOLIC BLOOD PRESSURE: 84 MMHG | TEMPERATURE: 97.2 F | WEIGHT: 123.4 LBS | HEIGHT: 56 IN | BODY MASS INDEX: 27.76 KG/M2 | OXYGEN SATURATION: 99 % | SYSTOLIC BLOOD PRESSURE: 132 MMHG

## 2022-04-04 DIAGNOSIS — J45.50 SEVERE PERSISTENT ASTHMA, WELL-CONTROLLED: Primary | ICD-10-CM

## 2022-04-04 DIAGNOSIS — J82.83 EOSINOPHILIC ASTHMA: ICD-10-CM

## 2022-04-04 PROCEDURE — 3017F COLORECTAL CA SCREEN DOC REV: CPT | Performed by: INTERNAL MEDICINE

## 2022-04-04 PROCEDURE — 1036F TOBACCO NON-USER: CPT | Performed by: INTERNAL MEDICINE

## 2022-04-04 PROCEDURE — G8417 CALC BMI ABV UP PARAM F/U: HCPCS | Performed by: INTERNAL MEDICINE

## 2022-04-04 PROCEDURE — G8427 DOCREV CUR MEDS BY ELIG CLIN: HCPCS | Performed by: INTERNAL MEDICINE

## 2022-04-04 PROCEDURE — 1090F PRES/ABSN URINE INCON ASSESS: CPT | Performed by: INTERNAL MEDICINE

## 2022-04-04 PROCEDURE — 4040F PNEUMOC VAC/ADMIN/RCVD: CPT | Performed by: INTERNAL MEDICINE

## 2022-04-04 PROCEDURE — 1123F ACP DISCUSS/DSCN MKR DOCD: CPT | Performed by: INTERNAL MEDICINE

## 2022-04-04 PROCEDURE — G8399 PT W/DXA RESULTS DOCUMENT: HCPCS | Performed by: INTERNAL MEDICINE

## 2022-04-04 PROCEDURE — 99213 OFFICE O/P EST LOW 20 MIN: CPT | Performed by: INTERNAL MEDICINE

## 2022-04-04 ASSESSMENT — ENCOUNTER SYMPTOMS
RESPIRATORY NEGATIVE: 1
EYES NEGATIVE: 1
GASTROINTESTINAL NEGATIVE: 1

## 2022-04-05 NOTE — PROGRESS NOTES
Subjective:      Patient ID: Lois Gongora is a 78 y.o. female being seen in my clinic for   Chief Complaint   Patient presents with    Asthma     f/u        HPI  Patient for eosinophilic asthma on Nucala 100 mg monthly. Receives her injections in the clinic. Refuses home injections. Asthma under excellent control. Remains on Dulera montelukast and albuterol. Rarely uses albuterol. Patient up-to-date with her COVID vaccinations. Review of Systems   Constitutional: Negative. HENT: Negative. Eyes: Negative. Respiratory: Negative. Cardiovascular: Negative. Gastrointestinal: Negative. All other systems reviewed and are negative.       Objective:     Vitals:    04/04/22 1511   BP: 132/84   Site: Left Lower Arm   Position: Sitting   Cuff Size: Small Adult   Pulse: 57   Temp: 97.2 °F (36.2 °C)   SpO2: 99%   Weight: 123 lb 6.4 oz (56 kg)   Height: 4' 8\" (1.422 m)     Current Outpatient Medications   Medication Sig Dispense Refill    DULERA 200-5 MCG/ACT inhaler USE 2 INHALATIONS EVERY 12 HOURS 3 each 1    montelukast (SINGULAIR) 10 MG tablet TAKE 1 TABLET NIGHTLY 90 tablet 1    cromolyn (OPTICROM) 4 % ophthalmic solution       cromolyn (OPTICROM) 4 % ophthalmic solution cromolyn 4 % eye drops   INSTILL 1 DROP INTO AFFECTED EYE(S) FOUR TIMES A DAY      albuterol sulfate HFA (VENTOLIN HFA) 108 (90 Base) MCG/ACT inhaler Inhale 2 puffs into the lungs every 6 hours as needed for Wheezing 1 Inhaler 11    ibuprofen (IBU) 600 MG tablet Take 1 tablet by mouth every 8 hours as needed for Pain 20 tablet 0    EPINEPHrine (EPIPEN 2-JESSICA) 0.3 MG/0.3ML SOAJ injection Inject 0.3 mLs into the muscle as needed (Patient to bring each time she gets a Nucala injection.) Use as directed for allergic reaction 2 each 0    losartan (COZAAR) 50 MG tablet Take 50 mg by mouth daily      fluticasone (FLONASE) 50 MCG/ACT nasal spray 1 spray by Nasal route daily      Cetirizine HCl 10 MG CAPS Take by mouth  ferrous sulfate 325 (65 FE) MG tablet Take 325 mg by mouth daily (with breakfast)      potassium chloride (KLOR-CON M) 20 MEQ extended release tablet TAKE 1 TABLET TWICE A DAY      aspirin 81 MG chewable tablet Take 1 tablet by mouth daily      metoprolol succinate (TOPROL XL) 100 MG extended release tablet Take 1 tablet by mouth daily      traMADol (ULTRAM) 50 MG tablet Take 1 tablet by mouth daily      hydrochlorothiazide (MICROZIDE) 12.5 MG capsule Take  by mouth daily.  albuterol (PROVENTIL) (2.5 MG/3ML) 0.083% nebulizer solution Take 1.25 mg by nebulization every 6 hours as needed for Wheezing        No current facility-administered medications for this visit. Physical Exam  Constitutional:       General: She is not in acute distress. HENT:      Head: Normocephalic. Eyes:      General: No scleral icterus. Conjunctiva/sclera: Conjunctivae normal.      Pupils: Pupils are equal, round, and reactive to light. Neck:      Thyroid: No thyromegaly. Trachea: No tracheal deviation. Cardiovascular:      Rate and Rhythm: Normal rate and regular rhythm. Heart sounds: Normal heart sounds. No murmur heard. No friction rub. No gallop. Pulmonary:      Effort: No respiratory distress. Breath sounds: Normal breath sounds. No decreased breath sounds, wheezing, rhonchi or rales. Chest:      Chest wall: No tenderness. There is no dullness to percussion. Abdominal:      General: Bowel sounds are normal. There is no distension. Palpations: Abdomen is soft. There is no mass. Tenderness: There is no abdominal tenderness. Musculoskeletal:         General: No tenderness. Normal range of motion. Cervical back: Neck supple. Lymphadenopathy:      Cervical: No cervical adenopathy. Skin:     General: Skin is warm and dry. Coloration: Skin is not pale. Findings: No erythema or rash.    Neurological:      Mental Status: She is alert and oriented to person, place, and time. Cranial Nerves: No cranial nerve deficit. Motor: No abnormal muscle tone. Coordination: Coordination abnormal.      Deep Tendon Reflexes: Reflexes are normal and symmetric. Reflexes normal.   Psychiatric:         Behavior: Behavior normal.         Thought Content: Thought content normal.         Judgment: Judgment normal.       Wt Readings from Last 3 Encounters:   04/04/22 123 lb 6.4 oz (56 kg)   03/15/22 124 lb 6.4 oz (56.4 kg)   02/15/22 123 lb (55.8 kg)     Results for orders placed or performed during the hospital encounter of 04/30/19   IgE   Result Value Ref Range    IgE 53 <101 IU/mL   Eosinophil Count   Result Value Ref Range    WBC NOT REPORTED k/uL    Eosinophils % NOT REPORTED %    Eosinophils Absolute 523 (H) 200 - 400 /uL       :      1. Severe persistent asthma, well-controlled    2. Eosinophilic asthma      There is no problem list on file for this patient. Plan:      1. Continue bronchodilators  2. Discussed strategies for management of asthma, including evaluation and descalation of therapy  3. Avoid environmental triggers  4. Continue Nucala. 5. Return to clinic in 6 months    No orders of the defined types were placed in this encounter. No orders of the defined types were placed in this encounter. Return in about 6 months (around 10/4/2022).        Electronically signed by Lakesha Wilkinson DO on 4/4/2022at 10:11 PM

## 2022-04-15 ENCOUNTER — OFFICE VISIT (OUTPATIENT)
Dept: PULMONOLOGY | Age: 77
End: 2022-04-15
Payer: MEDICARE

## 2022-04-15 VITALS
BODY MASS INDEX: 28.12 KG/M2 | SYSTOLIC BLOOD PRESSURE: 146 MMHG | OXYGEN SATURATION: 98 % | RESPIRATION RATE: 16 BRPM | TEMPERATURE: 97.9 F | HEART RATE: 56 BPM | HEIGHT: 56 IN | DIASTOLIC BLOOD PRESSURE: 82 MMHG | WEIGHT: 125 LBS

## 2022-04-15 DIAGNOSIS — J45.50 SEVERE PERSISTENT ASTHMA, WELL-CONTROLLED: Primary | ICD-10-CM

## 2022-04-15 PROCEDURE — 96372 THER/PROPH/DIAG INJ SC/IM: CPT | Performed by: INTERNAL MEDICINE

## 2022-05-17 ENCOUNTER — NURSE ONLY (OUTPATIENT)
Dept: PULMONOLOGY | Age: 77
End: 2022-05-17
Payer: MEDICARE

## 2022-05-17 VITALS
HEIGHT: 56 IN | WEIGHT: 122 LBS | RESPIRATION RATE: 16 BRPM | TEMPERATURE: 97.5 F | DIASTOLIC BLOOD PRESSURE: 98 MMHG | HEART RATE: 64 BPM | OXYGEN SATURATION: 100 % | SYSTOLIC BLOOD PRESSURE: 162 MMHG | BODY MASS INDEX: 27.44 KG/M2

## 2022-05-17 DIAGNOSIS — J45.50 SEVERE PERSISTENT ASTHMA, WELL-CONTROLLED: Primary | ICD-10-CM

## 2022-05-17 PROCEDURE — 96372 THER/PROPH/DIAG INJ SC/IM: CPT | Performed by: NURSE PRACTITIONER

## 2022-05-17 NOTE — PROGRESS NOTES
After obtaining consent, and per orders of Suhail Story CNP, injection of Menendez Payment was given in Left arm by Rony Steele MA. Patient instructed to remain in clinic for 20 minutes afterwards, and to report any adverse reaction to me immediately.

## 2022-06-14 ENCOUNTER — OFFICE VISIT (OUTPATIENT)
Dept: PULMONOLOGY | Age: 77
End: 2022-06-14
Payer: MEDICARE

## 2022-06-14 VITALS
HEIGHT: 56 IN | HEART RATE: 58 BPM | DIASTOLIC BLOOD PRESSURE: 78 MMHG | TEMPERATURE: 97.1 F | BODY MASS INDEX: 27.67 KG/M2 | SYSTOLIC BLOOD PRESSURE: 122 MMHG | OXYGEN SATURATION: 98 % | WEIGHT: 123 LBS | RESPIRATION RATE: 16 BRPM

## 2022-06-14 DIAGNOSIS — J45.50 SEVERE PERSISTENT ASTHMA, WELL-CONTROLLED: Primary | ICD-10-CM

## 2022-06-14 PROCEDURE — 96372 THER/PROPH/DIAG INJ SC/IM: CPT | Performed by: INTERNAL MEDICINE

## 2022-06-14 NOTE — PROGRESS NOTES
After obtaining consent, and per orders of Dr. Bhumi Maldonado, injection of Nucala given in Right arm by Khari Garay MA. Patient instructed to remain in clinic for 20 minutes afterwards, and to report any adverse reaction to me immediately.

## 2022-07-12 ENCOUNTER — OFFICE VISIT (OUTPATIENT)
Dept: PULMONOLOGY | Age: 77
End: 2022-07-12
Payer: MEDICARE

## 2022-07-12 VITALS — WEIGHT: 124 LBS | BODY MASS INDEX: 27.8 KG/M2 | SYSTOLIC BLOOD PRESSURE: 148 MMHG | DIASTOLIC BLOOD PRESSURE: 68 MMHG

## 2022-07-12 DIAGNOSIS — J45.50 SEVERE PERSISTENT ASTHMA, WELL-CONTROLLED: Primary | ICD-10-CM

## 2022-07-12 PROCEDURE — 96372 THER/PROPH/DIAG INJ SC/IM: CPT | Performed by: INTERNAL MEDICINE

## 2022-07-13 DIAGNOSIS — J45.52 ASTHMA, SEVERE PERSISTENT, POORLY-CONTROLLED, WITH STATUS ASTHMATICUS: ICD-10-CM

## 2022-07-13 RX ORDER — MONTELUKAST SODIUM 10 MG/1
TABLET ORAL
Qty: 90 TABLET | Refills: 2 | Status: SHIPPED | OUTPATIENT
Start: 2022-07-13

## 2022-07-13 NOTE — TELEPHONE ENCOUNTER
LAST VISIT: 4/4/22  NEXT VISIT: 10/3/22    Per last dictation patient is on this medication. Please sign for refill if ok. Thank you.

## 2022-08-09 ENCOUNTER — TELEPHONE (OUTPATIENT)
Dept: PULMONOLOGY | Age: 77
End: 2022-08-09

## 2022-08-15 ENCOUNTER — NURSE ONLY (OUTPATIENT)
Dept: PULMONOLOGY | Age: 77
End: 2022-08-15
Payer: MEDICARE

## 2022-08-15 VITALS
DIASTOLIC BLOOD PRESSURE: 80 MMHG | OXYGEN SATURATION: 98 % | HEART RATE: 58 BPM | WEIGHT: 123.2 LBS | TEMPERATURE: 97.7 F | HEIGHT: 56 IN | RESPIRATION RATE: 16 BRPM | SYSTOLIC BLOOD PRESSURE: 129 MMHG | BODY MASS INDEX: 27.71 KG/M2

## 2022-08-15 DIAGNOSIS — J45.52 ASTHMA, SEVERE PERSISTENT, POORLY-CONTROLLED, WITH STATUS ASTHMATICUS: Primary | ICD-10-CM

## 2022-08-15 PROCEDURE — 96372 THER/PROPH/DIAG INJ SC/IM: CPT | Performed by: NURSE PRACTITIONER

## 2022-08-15 NOTE — PROGRESS NOTES
After obtaining consent, and per orders of  Issa Wang NP, injection of Nucala given in Left arm by Lauren Trejo MA. Patient instructed to remain in clinic for 20 minutes afterwards, and to report any adverse reaction to me immediately.   Patient Supplied

## 2022-09-06 ENCOUNTER — TELEPHONE (OUTPATIENT)
Dept: PULMONOLOGY | Age: 77
End: 2022-09-06

## 2022-09-06 DIAGNOSIS — J45.51 SEVERE PERSISTENT ASTHMA WITH EXACERBATION: Primary | ICD-10-CM

## 2022-09-06 NOTE — TELEPHONE ENCOUNTER
Patient called and she has been coughing up mucus for about 2 weeks. She states she has tried everything, halls, cough medicine , tea, honey. She wants to know what you would like her to try? She does not want prednisone.

## 2022-09-07 DIAGNOSIS — J45.50 SEVERE PERSISTENT ASTHMA, UNSPECIFIED WHETHER COMPLICATED: ICD-10-CM

## 2022-09-07 RX ORDER — MOMETASONE FUROATE AND FORMOTEROL FUMARATE DIHYDRATE 200; 5 UG/1; UG/1
AEROSOL RESPIRATORY (INHALATION)
Qty: 3 EACH | Refills: 1 | Status: SHIPPED | OUTPATIENT
Start: 2022-09-07

## 2022-09-07 RX ORDER — PREDNISONE 10 MG/1
40 TABLET ORAL DAILY
Qty: 20 TABLET | Refills: 0 | Status: SHIPPED | OUTPATIENT
Start: 2022-09-07 | End: 2022-09-12

## 2022-09-07 NOTE — TELEPHONE ENCOUNTER
Patient would like to try the prednisone but only for a few days if she can. I did not do an order I was not sure what one you wanted.  thanks

## 2022-09-14 ENCOUNTER — OFFICE VISIT (OUTPATIENT)
Dept: PULMONOLOGY | Age: 77
End: 2022-09-14
Payer: MEDICARE

## 2022-09-14 VITALS
BODY MASS INDEX: 27.76 KG/M2 | HEART RATE: 85 BPM | DIASTOLIC BLOOD PRESSURE: 80 MMHG | SYSTOLIC BLOOD PRESSURE: 140 MMHG | OXYGEN SATURATION: 98 % | WEIGHT: 123.4 LBS | HEIGHT: 56 IN

## 2022-09-14 DIAGNOSIS — J45.50 SEVERE PERSISTENT ASTHMA, UNSPECIFIED WHETHER COMPLICATED: Primary | ICD-10-CM

## 2022-09-14 PROCEDURE — 96372 THER/PROPH/DIAG INJ SC/IM: CPT | Performed by: INTERNAL MEDICINE

## 2022-09-14 NOTE — PROGRESS NOTES
After obtaining consent, and per orders of Dr. Lucho Woods, injection of Nucala given in Right deltoid by Nunu Etienne MA. Patient instructed to remain in clinic for 20 minutes afterwards, and to report any adverse reaction to me immediately.

## 2022-10-03 ENCOUNTER — OFFICE VISIT (OUTPATIENT)
Dept: PULMONOLOGY | Age: 77
End: 2022-10-03
Payer: MEDICARE

## 2022-10-03 VITALS
SYSTOLIC BLOOD PRESSURE: 174 MMHG | HEART RATE: 56 BPM | DIASTOLIC BLOOD PRESSURE: 96 MMHG | HEIGHT: 56 IN | RESPIRATION RATE: 16 BRPM | OXYGEN SATURATION: 97 % | WEIGHT: 123 LBS | BODY MASS INDEX: 27.67 KG/M2

## 2022-10-03 DIAGNOSIS — J45.50 SEVERE PERSISTENT ASTHMA, WELL-CONTROLLED: Primary | ICD-10-CM

## 2022-10-03 DIAGNOSIS — J82.83 EOSINOPHILIC ASTHMA: ICD-10-CM

## 2022-10-03 PROCEDURE — 1036F TOBACCO NON-USER: CPT | Performed by: INTERNAL MEDICINE

## 2022-10-03 PROCEDURE — 3017F COLORECTAL CA SCREEN DOC REV: CPT | Performed by: INTERNAL MEDICINE

## 2022-10-03 PROCEDURE — 1090F PRES/ABSN URINE INCON ASSESS: CPT | Performed by: INTERNAL MEDICINE

## 2022-10-03 PROCEDURE — G8399 PT W/DXA RESULTS DOCUMENT: HCPCS | Performed by: INTERNAL MEDICINE

## 2022-10-03 PROCEDURE — G8427 DOCREV CUR MEDS BY ELIG CLIN: HCPCS | Performed by: INTERNAL MEDICINE

## 2022-10-03 PROCEDURE — 1123F ACP DISCUSS/DSCN MKR DOCD: CPT | Performed by: INTERNAL MEDICINE

## 2022-10-03 PROCEDURE — 99213 OFFICE O/P EST LOW 20 MIN: CPT | Performed by: INTERNAL MEDICINE

## 2022-10-03 PROCEDURE — G8484 FLU IMMUNIZE NO ADMIN: HCPCS | Performed by: INTERNAL MEDICINE

## 2022-10-03 PROCEDURE — G8417 CALC BMI ABV UP PARAM F/U: HCPCS | Performed by: INTERNAL MEDICINE

## 2022-10-03 RX ORDER — ALBUTEROL SULFATE 90 UG/1
2 AEROSOL, METERED RESPIRATORY (INHALATION) EVERY 6 HOURS PRN
Qty: 3 EACH | Refills: 3 | Status: SHIPPED | OUTPATIENT
Start: 2022-10-03

## 2022-10-03 ASSESSMENT — ENCOUNTER SYMPTOMS
GASTROINTESTINAL NEGATIVE: 1
SHORTNESS OF BREATH: 1
EYES NEGATIVE: 1

## 2022-10-03 NOTE — PROGRESS NOTES
Subjective:      Patient ID: Leobardo Navarrete is a 78 y.o. female being seen in my clinic for   Chief Complaint   Patient presents with    Asthma       HPI  Follow-up for eosinophilic asthma well-controlled on Nucala. Patient remains on Dulera 200 mcg and montelukast 10 mg.  Uses ProAir HFA as needed. Needs refills. Monthly Nucala shots received in our office. Unable to give herself an injection. Patient receptive to omicron booster and flu shot. Asthma under good control. Denies nocturnal or exercise-induced symptoms. Some shortness of breath, multifactorial.  No cough or wheeze. No steroids, urgent care or emergency room visits. Review of Systems   Constitutional: Negative. HENT: Negative. Eyes: Negative. Respiratory:  Positive for shortness of breath. Cardiovascular: Negative. Gastrointestinal: Negative. All other systems reviewed and are negative. Objective:     Vitals:    10/03/22 1424 10/03/22 1426   BP: (!) 174/96 (!) 174/96   Site: Right Lower Arm Left Lower Arm   Position: Sitting Sitting   Cuff Size: Small Adult Small Adult   Pulse: 56    Resp: 16    SpO2: 97%  Comment: room air at rest    Weight: 123 lb (55.8 kg)    Height: 4' 8\" (1.422 m)      Current Outpatient Medications   Medication Sig Dispense Refill    albuterol sulfate HFA (PROAIR HFA) 108 (90 Base) MCG/ACT inhaler Inhale 2 puffs into the lungs every 6 hours as needed for Wheezing 3 each 3    DULERA 200-5 MCG/ACT inhaler USE 2 INHALATIONS EVERY 12 HOURS.  3 each 1    montelukast (SINGULAIR) 10 MG tablet TAKE 1 TABLET NIGHTLY 90 tablet 2    cromolyn (OPTICROM) 4 % ophthalmic solution       cromolyn (OPTICROM) 4 % ophthalmic solution cromolyn 4 % eye drops   INSTILL 1 DROP INTO AFFECTED EYE(S) FOUR TIMES A DAY      ibuprofen (IBU) 600 MG tablet Take 1 tablet by mouth every 8 hours as needed for Pain 20 tablet 0    EPINEPHrine (EPIPEN 2-JESSICA) 0.3 MG/0.3ML SOAJ injection Inject 0.3 mLs into the muscle as needed (Patient to bring each time she gets a Nucala injection.) Use as directed for allergic reaction 2 each 0    losartan (COZAAR) 50 MG tablet Take 50 mg by mouth daily      fluticasone (FLONASE) 50 MCG/ACT nasal spray 1 spray by Nasal route daily      Cetirizine HCl 10 MG CAPS Take by mouth      ferrous sulfate 325 (65 FE) MG tablet Take 325 mg by mouth daily (with breakfast)      potassium chloride (KLOR-CON M) 20 MEQ extended release tablet TAKE 1 TABLET TWICE A DAY      aspirin 81 MG chewable tablet Take 1 tablet by mouth daily      metoprolol succinate (TOPROL XL) 100 MG extended release tablet Take 1 tablet by mouth daily      traMADol (ULTRAM) 50 MG tablet Take 1 tablet by mouth daily      hydrochlorothiazide (MICROZIDE) 12.5 MG capsule Take  by mouth daily. albuterol (PROVENTIL) (2.5 MG/3ML) 0.083% nebulizer solution Take 1.25 mg by nebulization every 6 hours as needed for Wheezing        No current facility-administered medications for this visit. Physical Exam  Vitals and nursing note reviewed. Constitutional:       Appearance: She is well-developed. HENT:      Head: Normocephalic. Nose: Nose normal. No congestion. Mouth/Throat:      Mouth: Mucous membranes are moist.      Pharynx: Oropharynx is clear. No oropharyngeal exudate. Eyes:      General: No scleral icterus. Conjunctiva/sclera: Conjunctivae normal.   Neck:      Thyroid: No thyromegaly. Vascular: No JVD. Trachea: No tracheal deviation. Cardiovascular:      Rate and Rhythm: Normal rate and regular rhythm. Heart sounds: Normal heart sounds. No murmur heard. No gallop. Pulmonary:      Effort: Pulmonary effort is normal. No respiratory distress. Breath sounds: No wheezing or rales. Chest:      Chest wall: No tenderness. Abdominal:      Palpations: Abdomen is soft. Tenderness: There is no abdominal tenderness. Musculoskeletal:      Cervical back: Neck supple. Right lower leg: No edema. Left lower leg: No edema. Lymphadenopathy:      Cervical: No cervical adenopathy. Skin:     General: Skin is warm and dry. Neurological:      Mental Status: She is alert and oriented to person, place, and time. Wt Readings from Last 3 Encounters:   10/03/22 123 lb (55.8 kg)   09/14/22 123 lb 6.4 oz (56 kg)   08/15/22 123 lb 3.2 oz (55.9 kg)     Results for orders placed or performed during the hospital encounter of 04/30/19   IgE   Result Value Ref Range    IgE 53 <101 IU/mL   Eosinophil Count   Result Value Ref Range    WBC NOT REPORTED k/uL    Eosinophils % NOT REPORTED %    Eosinophils Absolute 523 (H) 200 - 400 /uL       :      1. Severe persistent asthma, well-controlled    2. Eosinophilic asthma      There is no problem list on file for this patient. Plan:      Continue bronchodilators  Discussed strategies for management of asthma, including evaluation and descalation of therapy  Avoid environmental triggers  Continue Nucala 100 mg monthly. Refilled ProAir HFA. Encouraged flu shot and omicron booster. Return to clinic in 12 months    Orders Placed This Encounter   Medications    albuterol sulfate HFA (PROAIR HFA) 108 (90 Base) MCG/ACT inhaler     Sig: Inhale 2 puffs into the lungs every 6 hours as needed for Wheezing     Dispense:  3 each     Refill:  3     No orders of the defined types were placed in this encounter. No follow-ups on file.        Electronically signed by Cynthia Tate DO on 10/3/2022at 3:59 PM

## 2022-10-12 ENCOUNTER — OFFICE VISIT (OUTPATIENT)
Dept: PULMONOLOGY | Age: 77
End: 2022-10-12
Payer: MEDICARE

## 2022-10-12 VITALS
BODY MASS INDEX: 27.67 KG/M2 | DIASTOLIC BLOOD PRESSURE: 71 MMHG | HEIGHT: 56 IN | HEART RATE: 63 BPM | SYSTOLIC BLOOD PRESSURE: 151 MMHG | RESPIRATION RATE: 14 BRPM | OXYGEN SATURATION: 96 % | WEIGHT: 123 LBS

## 2022-10-12 DIAGNOSIS — J45.50 SEVERE PERSISTENT ASTHMA, WELL-CONTROLLED: Primary | ICD-10-CM

## 2022-10-12 PROCEDURE — 96372 THER/PROPH/DIAG INJ SC/IM: CPT | Performed by: INTERNAL MEDICINE

## 2022-10-12 NOTE — PROGRESS NOTES
After obtaining consent, and per orders of Dr. Kia Johnston, injection of Nucala  given in Left arm by Chris Arroyo MA. Patient instructed to remain in clinic for 20 minutes afterwards, and to report any adverse reaction to me immediately.    Patient supplied

## 2022-11-07 ENCOUNTER — TELEPHONE (OUTPATIENT)
Dept: PULMONOLOGY | Age: 77
End: 2022-11-07

## 2022-11-07 NOTE — TELEPHONE ENCOUNTER
SAKSHI PLUMMER (Key: [id number])  Nucala 100MG solution     Form  [employer] Electronic PA Form (2017 [employer])  Created  4 hours ago  Sent to Plan  28 minutes ago  Plan Response  28 minutes ago  Submit Clinical Questions  20 minutes ago  Determination  Favorable  20 minutes ago  Message from Brendan Head; Review Type:Prior Auth; Coverage Start Date:10/08/2022; Coverage End Date:11/07/2023;

## 2022-11-09 DIAGNOSIS — J82.83 EOSINOPHILIC ASTHMA: Primary | ICD-10-CM

## 2022-11-09 RX ORDER — MEPOLIZUMAB 100 MG/ML
INJECTION, POWDER, FOR SOLUTION SUBCUTANEOUS
Qty: 1 EACH | Refills: 11 | Status: SHIPPED | OUTPATIENT
Start: 2022-11-09

## 2022-11-10 ENCOUNTER — OFFICE VISIT (OUTPATIENT)
Dept: PULMONOLOGY | Age: 77
End: 2022-11-10
Payer: MEDICARE

## 2022-11-10 VITALS
BODY MASS INDEX: 27.36 KG/M2 | OXYGEN SATURATION: 100 % | RESPIRATION RATE: 16 BRPM | HEIGHT: 56 IN | SYSTOLIC BLOOD PRESSURE: 142 MMHG | TEMPERATURE: 97.1 F | HEART RATE: 60 BPM | WEIGHT: 121.6 LBS | DIASTOLIC BLOOD PRESSURE: 84 MMHG

## 2022-11-10 DIAGNOSIS — J82.83 EOSINOPHILIC ASTHMA: Primary | ICD-10-CM

## 2022-11-10 DIAGNOSIS — J45.50 SEVERE PERSISTENT ASTHMA, WELL-CONTROLLED: ICD-10-CM

## 2022-11-10 PROCEDURE — 96372 THER/PROPH/DIAG INJ SC/IM: CPT | Performed by: INTERNAL MEDICINE

## 2022-11-10 NOTE — PROGRESS NOTES
After obtaining consent, and per orders of Dr. Liberty Naik, injection of Chalino Pupa was given in the left upper arm by Alona Gordon MA. Patient was instructed to remain in the clinic for 20 minutes afterwards, and to report any adverse reaction to me immediately.

## 2022-12-15 ENCOUNTER — OFFICE VISIT (OUTPATIENT)
Dept: PULMONOLOGY | Age: 77
End: 2022-12-15
Payer: MEDICARE

## 2022-12-15 VITALS
SYSTOLIC BLOOD PRESSURE: 139 MMHG | OXYGEN SATURATION: 90 % | DIASTOLIC BLOOD PRESSURE: 82 MMHG | RESPIRATION RATE: 16 BRPM | HEART RATE: 69 BPM | BODY MASS INDEX: 26.99 KG/M2 | WEIGHT: 120 LBS | HEIGHT: 56 IN

## 2022-12-15 DIAGNOSIS — J82.83 EOSINOPHILIC ASTHMA: Primary | ICD-10-CM

## 2022-12-15 PROCEDURE — 99999 PR OFFICE/OUTPT VISIT,PROCEDURE ONLY: CPT | Performed by: INTERNAL MEDICINE

## 2022-12-15 PROCEDURE — 96372 THER/PROPH/DIAG INJ SC/IM: CPT | Performed by: INTERNAL MEDICINE

## 2022-12-15 NOTE — PROGRESS NOTES
After obtaining consent, and per orders of Dr. Sophie Luna , injection of Nucala given in Right deltoid by Audie Moya MA. Patient instructed to remain in clinic for 20 minutes afterwards, and to report any adverse reaction to me immediately.

## 2023-01-10 ENCOUNTER — TELEPHONE (OUTPATIENT)
Dept: PULMONOLOGY | Age: 78
End: 2023-01-10

## 2023-01-10 NOTE — TELEPHONE ENCOUNTER
Cathie Clayton from [facility] called and stated that they dont have a current prescription for patients Nucala. I gave a verbal the script was written 11/9/2022.

## 2023-01-12 ENCOUNTER — OFFICE VISIT (OUTPATIENT)
Dept: PULMONOLOGY | Age: 78
End: 2023-01-12

## 2023-01-12 VITALS
BODY MASS INDEX: 26.54 KG/M2 | DIASTOLIC BLOOD PRESSURE: 81 MMHG | WEIGHT: 118 LBS | OXYGEN SATURATION: 99 % | RESPIRATION RATE: 15 BRPM | HEIGHT: 56 IN | SYSTOLIC BLOOD PRESSURE: 139 MMHG | TEMPERATURE: 97 F | HEART RATE: 64 BPM

## 2023-01-12 DIAGNOSIS — J82.83 EOSINOPHILIC ASTHMA: Primary | ICD-10-CM

## 2023-01-12 NOTE — PROGRESS NOTES
After obtaining consent, and per orders of Dr. Greg Tate, injection of Nucala given in Right arm by Romana Gable, MA. Patient instructed to remain in clinic for 20 minutes afterwards, and to report any adverse reaction to me immediately. Patient supplied medication.  No reaction

## 2023-02-09 ENCOUNTER — OFFICE VISIT (OUTPATIENT)
Dept: PULMONOLOGY | Age: 78
End: 2023-02-09

## 2023-02-09 VITALS
BODY MASS INDEX: 27.22 KG/M2 | SYSTOLIC BLOOD PRESSURE: 169 MMHG | WEIGHT: 121 LBS | HEART RATE: 57 BPM | OXYGEN SATURATION: 99 % | TEMPERATURE: 97.2 F | DIASTOLIC BLOOD PRESSURE: 84 MMHG | HEIGHT: 56 IN | RESPIRATION RATE: 20 BRPM

## 2023-02-09 DIAGNOSIS — J45.50 SEVERE PERSISTENT ASTHMA, WELL-CONTROLLED: ICD-10-CM

## 2023-02-09 DIAGNOSIS — J82.83 EOSINOPHILIC ASTHMA: Primary | ICD-10-CM

## 2023-02-09 NOTE — PROGRESS NOTES
After obtaining consent, and per orders of Dr. Bee Christian, injection of Nucala given in Left arm by Felipe Segovia MA. Patient instructed to remain in clinic for 20 minutes afterwards, and to report any adverse reaction to me immediately.

## 2023-03-16 ENCOUNTER — TELEPHONE (OUTPATIENT)
Dept: PULMONOLOGY | Age: 78
End: 2023-03-16

## 2023-03-16 ENCOUNTER — OFFICE VISIT (OUTPATIENT)
Dept: PULMONOLOGY | Age: 78
End: 2023-03-16
Payer: MEDICARE

## 2023-03-16 VITALS
WEIGHT: 120 LBS | HEART RATE: 58 BPM | HEIGHT: 56 IN | SYSTOLIC BLOOD PRESSURE: 128 MMHG | BODY MASS INDEX: 26.99 KG/M2 | RESPIRATION RATE: 16 BRPM | DIASTOLIC BLOOD PRESSURE: 82 MMHG

## 2023-03-16 DIAGNOSIS — J82.83 EOSINOPHILIC ASTHMA: Primary | ICD-10-CM

## 2023-03-16 DIAGNOSIS — J45.51 SEVERE PERSISTENT ASTHMA WITH EXACERBATION: Primary | ICD-10-CM

## 2023-03-16 PROCEDURE — 96372 THER/PROPH/DIAG INJ SC/IM: CPT | Performed by: INTERNAL MEDICINE

## 2023-03-16 NOTE — PROGRESS NOTES
After obtaining consent, and per orders of Dr. Denae Das, injection of Nucala given in Left arm by Ana Renee MA. Patient instructed to remain in clinic for 20 minutes afterwards, and to report any adverse reaction to me immediately.   Patient supplied

## 2023-03-16 NOTE — TELEPHONE ENCOUNTER
Chief complaint:   Chief Complaint   Patient presents with   • Sinus Problem     diagnosis with bronchitis right after Thanksgiving. Treated with Rocephin    • Cough     slight cough ,sometimes dry , sometimes productive. Chest pain at lateral ribs and sternum     Vitals:  Visit Vitals   • /62 (BP Location: [facility], Patient Position: Sitting, Cuff Size: Regular)   • Pulse 76   • Temp 98.5 °F (36.9 °C) (Oral)   • Resp 16   • Ht 5' 6\" (1.676 m)   • Wt 75.3 kg   • LMP 01/18/2017 (Exact Date)   • BMI 26.79 kg/m2   O2 98%    HISTORY OF PRESENT ILLNESS     HPI  Willow Guidry is a 33 year old female who presents with persistent cough for about 2 months.  Patient reports that she was treated for bronchitis around Thanksgiving, was given Rocephin and had some improvement for about 2 weeks though after that has had a persistent intermittent cough. She denies fever or chills. She has no history of asthma or pulmonary disease. She describes the cough as sometimes dry and sometimes phlegmy. She does have a history of chronic environmental allergies, rhinitis and sinusitis. She has seen an allergist in [place] in the past and takes allergy medications and a nasal spray. She does have some sinus congestion and a runny nose currently with primarily clear drainage per patient. She denies any wheezing or shortness of breath. She made an appointment to establish with a primary care doctor with an appointment in about a week and a half. She reports a concern for mold in her apartment, states that an abatement was done for only a portion of the apartment and she is concerned that there may still be some mold.    Other significant problems:  There are no active problems to display for this patient.      PAST MEDICAL, FAMILY AND SOCIAL HISTORY     Medications:  Current Outpatient Prescriptions   Medication   • pantoprazole (PROTONIX) 40 MG tablet   • Cetirizine HCl 10 MG Cap   • montelukast (SINGULAIR) 10 MG tablet   • escitalopram  Patient came in today for her Nucala injection and stated that she has had a lot of thick mucus for about a week now that is constantly draining keeping her up at night. Please advise.   Scripts for antibiotics will go to Jonathan aid in her chart (LEXAPRO) 20 MG tablet   • LORazepam (ATIVAN) 1 MG tablet   • sumatriptan (IMITREX) 50 MG tablet   • ondansetron (ZOFRAN) 4 MG tablet   • desogestrel-ethinyl estradiol (RECLIPSEN) 0.15-30 MG-MCG per tablet   • dicyclomine (BENTYL) 10 MG capsule     No current facility-administered medications for this visit.        Allergies:  ALLERGIES:   Allergen Reactions   • Azithromycin Nausea & Vomiting   • Ceftin [Cefuroxime Axetil] RASH   • Percocet [Oxycodone-Acetaminophen] GI UPSET and VOMITING   • Vicodin [Hydrocodone-Acetaminophen] GI UPSET and VOMITING       Past Medical  History/Surgeries:  No past medical history on file.    No past surgical history on file.    Family History:  No family history on file.    Social History:  Social History   Substance Use Topics   • Smoking status: Never Smoker   • Smokeless tobacco: Never Used   • Alcohol use Not on file       REVIEW OF SYSTEMS     Review of Systems See HPI    PHYSICAL EXAM     Physical Exam   Constitutional: She does not appear ill. No distress.   HENT:   Head: Normocephalic.   Right Ear: Tympanic membrane and ear canal normal.   Left Ear: Tympanic membrane and ear canal normal.   Nose: Rhinorrhea present. No sinus tenderness. Right sinus exhibits no maxillary sinus tenderness and no frontal sinus tenderness. Left sinus exhibits no maxillary sinus tenderness and no frontal sinus tenderness.   Mouth/Throat: Oropharynx is clear and moist and mucous membranes are normal.   Eyes: Conjunctivae are normal. Pupils are equal, round, and reactive to light.   Cardiovascular: Normal rate and regular rhythm.    Pulmonary/Chest: Effort normal. She has no decreased breath sounds. She has no wheezes. She has no rhonchi. She has no rales.   Lymphadenopathy:     She has no cervical adenopathy.   Neurological: She is alert.   Skin: No rash noted.   Psychiatric: She has a normal mood and affect. Her speech is normal.   Vitals reviewed.      ASSESSMENT/PLAN     Persistent cough for ~ 2  months, no fevers, CTAB.  CXR done: Normal per radiology report.   Environmental allergies, rhinitis and sinus symptoms.  Continue prescribed medications and zyrtec daily.  Drink plenty of fluids.  FU with PCP as scheduled.  Pt may need further w/u with new ENT considering long standing hx of environmental allergies with rhinitis and chronic sinusitis.

## 2023-03-17 RX ORDER — AZITHROMYCIN 250 MG/1
250 TABLET, FILM COATED ORAL SEE ADMIN INSTRUCTIONS
Qty: 6 TABLET | Refills: 0 | Status: SHIPPED | OUTPATIENT
Start: 2023-03-17 | End: 2023-03-22

## 2023-04-03 ENCOUNTER — OFFICE VISIT (OUTPATIENT)
Dept: PULMONOLOGY | Age: 78
End: 2023-04-03
Payer: MEDICARE

## 2023-04-03 VITALS
HEIGHT: 56 IN | SYSTOLIC BLOOD PRESSURE: 140 MMHG | HEART RATE: 57 BPM | DIASTOLIC BLOOD PRESSURE: 78 MMHG | WEIGHT: 120 LBS | OXYGEN SATURATION: 90 % | RESPIRATION RATE: 18 BRPM | BODY MASS INDEX: 26.99 KG/M2

## 2023-04-03 DIAGNOSIS — J82.83 EOSINOPHILIC ASTHMA: Primary | ICD-10-CM

## 2023-04-03 DIAGNOSIS — J30.9 ALLERGIC CONJUNCTIVITIS AND RHINITIS, BILATERAL: ICD-10-CM

## 2023-04-03 DIAGNOSIS — J45.50 SEVERE PERSISTENT ASTHMA, WELL-CONTROLLED: ICD-10-CM

## 2023-04-03 DIAGNOSIS — H10.13 ALLERGIC CONJUNCTIVITIS AND RHINITIS, BILATERAL: ICD-10-CM

## 2023-04-03 PROCEDURE — G8399 PT W/DXA RESULTS DOCUMENT: HCPCS | Performed by: INTERNAL MEDICINE

## 2023-04-03 PROCEDURE — 99213 OFFICE O/P EST LOW 20 MIN: CPT | Performed by: INTERNAL MEDICINE

## 2023-04-03 PROCEDURE — G8417 CALC BMI ABV UP PARAM F/U: HCPCS | Performed by: INTERNAL MEDICINE

## 2023-04-03 PROCEDURE — 1090F PRES/ABSN URINE INCON ASSESS: CPT | Performed by: INTERNAL MEDICINE

## 2023-04-03 PROCEDURE — G8427 DOCREV CUR MEDS BY ELIG CLIN: HCPCS | Performed by: INTERNAL MEDICINE

## 2023-04-03 PROCEDURE — 1123F ACP DISCUSS/DSCN MKR DOCD: CPT | Performed by: INTERNAL MEDICINE

## 2023-04-03 PROCEDURE — 1036F TOBACCO NON-USER: CPT | Performed by: INTERNAL MEDICINE

## 2023-04-03 RX ORDER — BUDESONIDE 0.5 MG/2ML
500 INHALANT ORAL 2 TIMES DAILY
Qty: 180 EACH | Refills: 3 | Status: SHIPPED | OUTPATIENT
Start: 2023-04-03

## 2023-04-03 RX ORDER — FLUTICASONE PROPIONATE 50 MCG
1 SPRAY, SUSPENSION (ML) NASAL 2 TIMES DAILY
Qty: 3 EACH | Refills: 3 | Status: SHIPPED | OUTPATIENT
Start: 2023-04-03

## 2023-04-03 RX ORDER — CROMOLYN SODIUM 40 MG/ML
1 SOLUTION/ DROPS OPHTHALMIC 2 TIMES DAILY
Qty: 3 EACH | Refills: 3 | Status: SHIPPED | OUTPATIENT
Start: 2023-04-03

## 2023-04-03 ASSESSMENT — ENCOUNTER SYMPTOMS
SHORTNESS OF BREATH: 1
EYES NEGATIVE: 1
GASTROINTESTINAL NEGATIVE: 1

## 2023-04-04 NOTE — PROGRESS NOTES
3    fluticasone (FLONASE) 50 MCG/ACT nasal spray     Si spray by Nasal route 2 times daily     Dispense:  3 each     Refill:  3     No orders of the defined types were placed in this encounter. Return in about 6 months (around 10/3/2023).        Electronically signed by Kiran Daley DO on 4/3/2023at 8:21 PM

## 2023-05-22 DIAGNOSIS — J45.52 ASTHMA, SEVERE PERSISTENT, POORLY-CONTROLLED, WITH STATUS ASTHMATICUS: ICD-10-CM

## 2023-05-22 RX ORDER — MONTELUKAST SODIUM 10 MG/1
10 TABLET ORAL NIGHTLY
Qty: 90 TABLET | Refills: 2 | Status: SHIPPED | OUTPATIENT
Start: 2023-05-22

## 2023-05-22 NOTE — TELEPHONE ENCOUNTER
LAST VISIT: 4/3/23  NEXT VISIT: 10/2/23    Patient is requesting Singulair 3 month supply to be sent to [facility]. Script pended, please sign if ok.

## 2023-09-11 ENCOUNTER — TELEPHONE (OUTPATIENT)
Dept: PULMONOLOGY | Age: 78
End: 2023-09-11

## 2023-09-11 NOTE — TELEPHONE ENCOUNTER
Yeny Hallman Key: [id number] - PA Case ID: [id number] help? Call us at [phone]  Outcome  Approvedtoday  Request Reference Number: [id number]. NUCALA INJ 100MG is approved through 12/31/2023. Your patient may now fill this prescription and it will be covered.   Drug  Nucala 100MG solution  Form  [employer] Medicare Part D Electronic Prior Authorization Form (2017 NCPDP)

## 2023-09-21 ENCOUNTER — TELEPHONE (OUTPATIENT)
Dept: PULMONOLOGY | Age: 78
End: 2023-09-21

## 2023-09-21 DIAGNOSIS — J45.51 SEVERE PERSISTENT ASTHMA WITH EXACERBATION: Primary | ICD-10-CM

## 2023-09-21 RX ORDER — PREDNISONE 10 MG/1
40 TABLET ORAL DAILY
Qty: 20 TABLET | Refills: 0 | Status: SHIPPED | OUTPATIENT
Start: 2023-09-21 | End: 2023-09-26

## 2023-09-21 RX ORDER — AZITHROMYCIN 250 MG/1
250 TABLET, FILM COATED ORAL SEE ADMIN INSTRUCTIONS
Qty: 6 TABLET | Refills: 0 | Status: SHIPPED | OUTPATIENT
Start: 2023-09-21 | End: 2023-09-26

## 2023-09-21 NOTE — TELEPHONE ENCOUNTER
Pt calling office asking for Rx - she has been coughing x 5 days and hasn't been able to sleep. No fever- body aches,runny nose and drainage in her throat, coughing clear \"slime\"    Neg covid test   No Fever     Pt requesting Rx be sent to [facility] on [address].

## 2023-10-02 ENCOUNTER — OFFICE VISIT (OUTPATIENT)
Dept: PULMONOLOGY | Age: 78
End: 2023-10-02
Payer: MEDICARE

## 2023-10-02 VITALS
DIASTOLIC BLOOD PRESSURE: 82 MMHG | OXYGEN SATURATION: 98 % | BODY MASS INDEX: 26.77 KG/M2 | HEART RATE: 61 BPM | RESPIRATION RATE: 14 BRPM | SYSTOLIC BLOOD PRESSURE: 138 MMHG | HEIGHT: 56 IN | WEIGHT: 119 LBS

## 2023-10-02 DIAGNOSIS — Z23 NEED FOR VACCINATION: Primary | ICD-10-CM

## 2023-10-02 DIAGNOSIS — J82.83 EOSINOPHILIC ASTHMA: ICD-10-CM

## 2023-10-02 DIAGNOSIS — J45.50 SEVERE PERSISTENT ASTHMA, WELL-CONTROLLED: ICD-10-CM

## 2023-10-02 PROCEDURE — G8484 FLU IMMUNIZE NO ADMIN: HCPCS | Performed by: INTERNAL MEDICINE

## 2023-10-02 PROCEDURE — 99213 OFFICE O/P EST LOW 20 MIN: CPT | Performed by: INTERNAL MEDICINE

## 2023-10-02 PROCEDURE — G0008 ADMIN INFLUENZA VIRUS VAC: HCPCS | Performed by: INTERNAL MEDICINE

## 2023-10-02 PROCEDURE — 1036F TOBACCO NON-USER: CPT | Performed by: INTERNAL MEDICINE

## 2023-10-02 PROCEDURE — 90694 VACC AIIV4 NO PRSRV 0.5ML IM: CPT | Performed by: INTERNAL MEDICINE

## 2023-10-02 PROCEDURE — G8417 CALC BMI ABV UP PARAM F/U: HCPCS | Performed by: INTERNAL MEDICINE

## 2023-10-02 PROCEDURE — 1123F ACP DISCUSS/DSCN MKR DOCD: CPT | Performed by: INTERNAL MEDICINE

## 2023-10-02 PROCEDURE — G8427 DOCREV CUR MEDS BY ELIG CLIN: HCPCS | Performed by: INTERNAL MEDICINE

## 2023-10-02 PROCEDURE — G8399 PT W/DXA RESULTS DOCUMENT: HCPCS | Performed by: INTERNAL MEDICINE

## 2023-10-02 PROCEDURE — 1090F PRES/ABSN URINE INCON ASSESS: CPT | Performed by: INTERNAL MEDICINE

## 2023-10-02 RX ORDER — ALBUTEROL SULFATE 90 UG/1
2 AEROSOL, METERED RESPIRATORY (INHALATION) EVERY 6 HOURS PRN
Qty: 3 EACH | Refills: 3 | Status: SHIPPED | OUTPATIENT
Start: 2023-10-02

## 2023-10-02 ASSESSMENT — ENCOUNTER SYMPTOMS
EYES NEGATIVE: 1
GASTROINTESTINAL NEGATIVE: 1
COUGH: 1
SHORTNESS OF BREATH: 1
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
## 2023-12-04 ENCOUNTER — TELEPHONE (OUTPATIENT)
Dept: PULMONOLOGY | Age: 78
End: 2023-12-04

## 2023-12-05 ENCOUNTER — TELEPHONE (OUTPATIENT)
Dept: PULMONOLOGY | Age: 78
End: 2023-12-05

## 2023-12-05 NOTE — TELEPHONE ENCOUNTER
Patient called requesting appointment with Dr. Zachery Murphy however he has nothing available until Feb 2024. Patient states she has been having a reaction to flu shot she had given in our office on 10/2. It started with very red rash/itching. Patient states she has had 3 steroid shots since and has seen a dermatologist which they have given the patient a cream an doral tablet. Patient went to [facility] ED. Dermatologist referred patient to see allergist.  Patient is still having itching symptoms. Patient wanted writer to send a message to see if Dr. Zachery Murphy had any other recommendations or if she should be seen.

## 2023-12-06 NOTE — TELEPHONE ENCOUNTER
Called patient to schedule virtual visit with Dr. Marcelo Hoffmann Friday. No answer. Left message. Dr Marcelo Hoffmann, your schedule is pretty full, do you have preferred time for virtual visit? Thank you.

## 2023-12-07 NOTE — TELEPHONE ENCOUNTER
Called patient to set up virtual visit with Dr Eve Meyer tomorrow to discuss flu shot reaction. No answer. Left messge on voicemail.

## 2023-12-08 ENCOUNTER — SCHEDULED TELEPHONE ENCOUNTER (OUTPATIENT)
Dept: PULMONOLOGY | Age: 78
End: 2023-12-08
Payer: MEDICARE

## 2023-12-08 DIAGNOSIS — J45.52 ASTHMA, SEVERE PERSISTENT, POORLY-CONTROLLED, WITH STATUS ASTHMATICUS: ICD-10-CM

## 2023-12-08 DIAGNOSIS — L29.9 ITCHY SKIN: Primary | ICD-10-CM

## 2023-12-08 DIAGNOSIS — J82.83 EOSINOPHILIC ASTHMA: ICD-10-CM

## 2023-12-08 PROCEDURE — 99212 OFFICE O/P EST SF 10 MIN: CPT | Performed by: INTERNAL MEDICINE

## 2023-12-08 RX ORDER — BETAMETHASONE DIPROPIONATE 0.5 MG/G
CREAM TOPICAL
COMMUNITY
Start: 2023-12-04

## 2023-12-08 RX ORDER — TRIAMCINOLONE ACETONIDE 0.25 MG/G
OINTMENT TOPICAL
COMMUNITY
Start: 2023-10-23

## 2023-12-08 RX ORDER — HYDROXYZINE HYDROCHLORIDE 25 MG/1
TABLET, FILM COATED ORAL
COMMUNITY
Start: 2023-11-20

## 2023-12-08 NOTE — PROGRESS NOTES
2023    [facility] KATIE Alfredo, was evaluated through a synchronous (real-time) audio-video encounter. The patient (or guardian if applicable) is aware that this is a billable service, which includes applicable co-pays. This Virtual Visit was conducted with patient's (and/or legal guardian's) consent. Patient identification was verified, and a caregiver was present when appropriate. The patient was located at Home: [address] [place] [address] Way  Provider was located at [facility] ([address] [place] [address]): [address] [facility],  [address] [facility]         Total time spent for this encounter: Not billed by time    --Renita Gray, DO on 2023 at 1:14 PM    An electronic signature was used to authenticate this note. Patient and physician are located in their individual locations. This is visit is completed via [employer] application []/ Telephone [x]     HPI:    Kayla Gonzalo (:  1945) has requested an audio/video evaluation for the following concern(s):    Patient called the office with concerns about reaction to flu shot administered nearly 2 months ago in our office. Patient states that she received the flu shot in early October. Shortly thereafter developed itching. Localized to the area of the injection however she states that over the ensuing months this pruritus had spread to her trunk and limbs. Specifically denied an urticarial rash. Reports a several urgent care and emergency room visits for same. In addition saw dermatologist recently. Claims that no clear diagnosis has been rendered. She has been treated with a number of courses of prednisone as well as topical steroids. Denies skin rash or eruption. Specifically denies any arthritis-like reaction. Denies previous episodes of same with vaccination. I asked her if she could associate this reaction with any other event. She states \"I do not know. \"    Asthma remains under good control on mepolizumab 100 mg autoinjection every 30 days.   Also

## 2024-01-11 ENCOUNTER — TELEPHONE (OUTPATIENT)
Dept: PULMONOLOGY | Age: 79
End: 2024-01-11

## 2024-01-11 DIAGNOSIS — J45.51 SEVERE PERSISTENT ASTHMA WITH EXACERBATION: Primary | ICD-10-CM

## 2024-01-11 RX ORDER — AZITHROMYCIN 250 MG/1
250 TABLET, FILM COATED ORAL SEE ADMIN INSTRUCTIONS
Qty: 6 TABLET | Refills: 0 | Status: SHIPPED | OUTPATIENT
Start: 2024-01-11 | End: 2024-01-16

## 2024-01-11 RX ORDER — PREDNISONE 10 MG/1
40 TABLET ORAL DAILY
Qty: 20 TABLET | Refills: 0 | Status: SHIPPED | OUTPATIENT
Start: 2024-01-11 | End: 2024-01-16

## 2024-01-11 NOTE — TELEPHONE ENCOUNTER
Patient called stating she is having a cough with yellow mucus, shortness of breath, congestion. She wants to know if something can be called in to her [facility]. Please advise.   
Patient notified of all information. Patient understands   
Chief Complaint: leakage from c/s incision    HPI: 35 y/o P2 s/p repeat c/s on , POD#17, with no significant pmhx, presents with clear to yellow watery drainage from incision since this morning, with surrounding redness. She denies fever, chills, n/v, abdominal pain, dysuria, or purulent discharge from incision. She was seen in the office today, skin incision was opened to 2cm, incision tacked 8cm deep and 8 cm to the right. Packing placed. Patient is currently breastfeeding.     Ob/Gyn History:    P1: FT LTCS with myomectomy  P2: FT repeat LTCS  h/o fibroids  Denies history of ovarian cysts, abnormal paps, or STIs

## 2024-02-01 ENCOUNTER — TELEPHONE (OUTPATIENT)
Dept: PULMONOLOGY | Age: 79
End: 2024-02-01

## 2024-02-01 DIAGNOSIS — J45.52 ASTHMA, SEVERE PERSISTENT, POORLY-CONTROLLED, WITH STATUS ASTHMATICUS: Primary | ICD-10-CM

## 2024-02-01 NOTE — TELEPHONE ENCOUNTER
Patient called and could barley talk because she is coughing so bad and her cough does sound horse.  Patient is wheezing and is not been able to sleep.  Patient is requesting something to get called into her pharmacy.  You sent a Zithromax and prednisone prescription on 1/11/24.  Patient was last seen 12/8/23.  Please advise

## 2024-02-02 RX ORDER — AZITHROMYCIN 250 MG/1
250 TABLET, FILM COATED ORAL SEE ADMIN INSTRUCTIONS
Qty: 6 TABLET | Refills: 0 | Status: SHIPPED | OUTPATIENT
Start: 2024-02-02 | End: 2024-02-07

## 2024-02-02 RX ORDER — PREDNISONE 10 MG/1
TABLET ORAL
Qty: 30 TABLET | Refills: 0 | Status: SHIPPED | OUTPATIENT
Start: 2024-02-02

## 2024-02-02 NOTE — TELEPHONE ENCOUNTER
Left message for patient that medication was sent to pharmacy and if she needed anything else to give the office a call back

## 2024-02-07 ENCOUNTER — TELEPHONE (OUTPATIENT)
Dept: PULMONOLOGY | Age: 79
End: 2024-02-07

## 2024-02-07 DIAGNOSIS — J45.50 SEVERE PERSISTENT ASTHMA, UNSPECIFIED WHETHER COMPLICATED: Primary | ICD-10-CM

## 2024-02-07 NOTE — TELEPHONE ENCOUNTER
Patient called asking if you would send Benzonatate to her pharmacy. Said it worked before when you prescribed it and her cough is still really bad. Please send script if you agree.

## 2024-02-08 RX ORDER — BENZONATATE 100 MG/1
100 CAPSULE ORAL 3 TIMES DAILY PRN
Qty: 30 CAPSULE | Refills: 1 | Status: SHIPPED | OUTPATIENT
Start: 2024-02-08 | End: 2024-02-18

## 2024-03-27 RX ORDER — MEPOLIZUMAB 100 MG/ML
INJECTION, SOLUTION SUBCUTANEOUS
Qty: 1 ML | Refills: 8 | Status: SHIPPED | OUTPATIENT
Start: 2024-03-27

## 2024-03-27 NOTE — TELEPHONE ENCOUNTER
LAST VISIT: 12/8/23  NEXT VISIT: 4/1/24    Per last dictation patient to continue this medication. Please sign for refill if ok. Thank you.

## 2024-04-07 DIAGNOSIS — J45.52 ASTHMA, SEVERE PERSISTENT, POORLY-CONTROLLED, WITH STATUS ASTHMATICUS: ICD-10-CM

## 2024-04-08 RX ORDER — MONTELUKAST SODIUM 10 MG/1
10 TABLET ORAL NIGHTLY
Qty: 90 TABLET | Refills: 3 | Status: SHIPPED | OUTPATIENT
Start: 2024-04-08

## 2024-05-20 ENCOUNTER — OFFICE VISIT (OUTPATIENT)
Dept: PULMONOLOGY | Age: 79
End: 2024-05-20
Payer: MEDICARE

## 2024-05-20 VITALS
HEART RATE: 55 BPM | WEIGHT: 117 LBS | HEIGHT: 56 IN | OXYGEN SATURATION: 97 % | BODY MASS INDEX: 26.32 KG/M2 | SYSTOLIC BLOOD PRESSURE: 139 MMHG | RESPIRATION RATE: 16 BRPM | DIASTOLIC BLOOD PRESSURE: 81 MMHG

## 2024-05-20 DIAGNOSIS — J45.50 SEVERE PERSISTENT ASTHMA, WELL-CONTROLLED: ICD-10-CM

## 2024-05-20 DIAGNOSIS — J82.83 EOSINOPHILIC ASTHMA: Primary | ICD-10-CM

## 2024-05-20 PROCEDURE — 99213 OFFICE O/P EST LOW 20 MIN: CPT | Performed by: INTERNAL MEDICINE

## 2024-05-20 PROCEDURE — 1123F ACP DISCUSS/DSCN MKR DOCD: CPT | Performed by: INTERNAL MEDICINE

## 2024-05-20 PROCEDURE — 1090F PRES/ABSN URINE INCON ASSESS: CPT | Performed by: INTERNAL MEDICINE

## 2024-05-20 PROCEDURE — G8417 CALC BMI ABV UP PARAM F/U: HCPCS | Performed by: INTERNAL MEDICINE

## 2024-05-20 PROCEDURE — G8427 DOCREV CUR MEDS BY ELIG CLIN: HCPCS | Performed by: INTERNAL MEDICINE

## 2024-05-20 PROCEDURE — G8399 PT W/DXA RESULTS DOCUMENT: HCPCS | Performed by: INTERNAL MEDICINE

## 2024-05-20 PROCEDURE — 1036F TOBACCO NON-USER: CPT | Performed by: INTERNAL MEDICINE

## 2024-05-20 RX ORDER — ALBUTEROL SULFATE 90 UG/1
2 AEROSOL, METERED RESPIRATORY (INHALATION) EVERY 6 HOURS PRN
Qty: 3 EACH | Refills: 3 | Status: SHIPPED | OUTPATIENT
Start: 2024-05-20

## 2024-05-20 RX ORDER — ALBUTEROL SULFATE 2.5 MG/3ML
1.25 SOLUTION RESPIRATORY (INHALATION) EVERY 6 HOURS PRN
Qty: 120 EACH | Refills: 11 | Status: SHIPPED | OUTPATIENT
Start: 2024-05-20

## 2024-05-20 RX ORDER — MONTELUKAST SODIUM 10 MG/1
10 TABLET ORAL NIGHTLY
Qty: 90 TABLET | Refills: 3 | Status: SHIPPED | OUTPATIENT
Start: 2024-05-20

## 2024-05-20 ASSESSMENT — ENCOUNTER SYMPTOMS
GASTROINTESTINAL NEGATIVE: 1
EYES NEGATIVE: 1
CHEST TIGHTNESS: 1
COUGH: 1
SHORTNESS OF BREATH: 1

## 2024-05-21 ENCOUNTER — TELEPHONE (OUTPATIENT)
Dept: PULMONOLOGY | Age: 79
End: 2024-05-21

## 2024-05-21 NOTE — PROGRESS NOTES
Subjective:      Patient ID: Lianet Alfredo is a 79 y.o. female being seen in my clinic for   Chief Complaint   Patient presents with    Asthma     Follow up        HPI  Follow-up visit for severe, persistent asthma with T2 high phenotype well-controlled on Nucala 100 mg autoinjector monthly.  Since her last visit 6 months ago she reports no hospitalizations or near need for steroids.  Also uses Dulera 200 mcg albuterol and Singulair.  No particular difficulty with Nucala.  Needs refills on her medication.    Review of Systems   Constitutional: Negative.    HENT: Negative.     Eyes: Negative.    Respiratory:  Positive for cough, chest tightness and shortness of breath.    Cardiovascular: Negative.    Gastrointestinal: Negative.    All other systems reviewed and are negative.      Objective:     Vitals:    05/20/24 1500   BP: 139/81   Site: Left Wrist   Pulse: 55   Resp: 16   SpO2: 97%  Comment: room air at rest   Weight: 53.1 kg (117 lb)   Height: 1.422 m (4' 8\")     Current Outpatient Medications   Medication Sig Dispense Refill    albuterol (PROVENTIL) (2.5 MG/3ML) 0.083% nebulizer solution Take 1.5 mLs by nebulization every 6 hours as needed for Wheezing 120 each 11    albuterol sulfate HFA (PROAIR HFA) 108 (90 Base) MCG/ACT inhaler Inhale 2 puffs into the lungs every 6 hours as needed for Wheezing 3 each 3    mometasone-formoterol (DULERA) 200-5 MCG/ACT inhaler Inhale 2 puffs into the lungs 2 times daily 3 each 3    montelukast (SINGULAIR) 10 MG tablet Take 1 tablet by mouth nightly 90 tablet 3    NUCALA 100 MG/ML SOAJ injection INJECT 100MG SUBCUTANEOUSLY  EVERY 4 WEEKS 1 mL 8    predniSONE (DELTASONE) 10 MG tablet 4 aqm x3d, [id number],4nfsa4f&off 30 tablet 0    betamethasone dipropionate 0.05 % cream       triamcinolone (KENALOG) 0.025 % ointment apply topically every morning and at bedtime      hydrOXYzine HCl (ATARAX) 25 MG tablet 12/8/23: Pt is not using this at this time.      cromolyn

## 2024-08-05 DIAGNOSIS — H10.13 ALLERGIC CONJUNCTIVITIS AND RHINITIS, BILATERAL: ICD-10-CM

## 2024-08-05 DIAGNOSIS — J30.9 ALLERGIC CONJUNCTIVITIS AND RHINITIS, BILATERAL: ICD-10-CM

## 2024-08-05 RX ORDER — FLUTICASONE PROPIONATE 50 MCG
1 SPRAY, SUSPENSION (ML) NASAL 2 TIMES DAILY
Qty: 3 EACH | Refills: 3 | Status: SHIPPED | OUTPATIENT
Start: 2024-08-05

## 2024-08-05 NOTE — TELEPHONE ENCOUNTER
Patient called and requested a refill on Flonase.  Patient was last seen 5/21/24 with Dr Junior and has a follow up with you 11/20/24.  I pended the script for your approval if you agree please sign

## 2024-09-22 DIAGNOSIS — J30.9 ALLERGIC CONJUNCTIVITIS AND RHINITIS, BILATERAL: ICD-10-CM

## 2024-09-22 DIAGNOSIS — H10.13 ALLERGIC CONJUNCTIVITIS AND RHINITIS, BILATERAL: ICD-10-CM

## 2024-09-23 NOTE — TELEPHONE ENCOUNTER
Last visit: 5/20/24 (Dr. Junior)  Next visit: 11/20/24 (Dr. Escamilla)     Refill request for Cromolyn eye drops. Last refill was given 4/3/23 for 1 year supply. In last dictation, there is no mention of this medication. Please see pended script and advise

## 2024-09-24 RX ORDER — CROMOLYN SODIUM 40 MG/ML
SOLUTION/ DROPS OPHTHALMIC
Qty: 30 ML | OUTPATIENT
Start: 2024-09-24

## 2024-09-30 DIAGNOSIS — J30.9 ALLERGIC CONJUNCTIVITIS AND RHINITIS, BILATERAL: ICD-10-CM

## 2024-09-30 DIAGNOSIS — H10.13 ALLERGIC CONJUNCTIVITIS AND RHINITIS, BILATERAL: ICD-10-CM

## 2024-09-30 RX ORDER — CROMOLYN SODIUM 40 MG/ML
1 SOLUTION/ DROPS OPHTHALMIC 2 TIMES DAILY
Qty: 3 EACH | Refills: 3 | Status: SHIPPED | OUTPATIENT
Start: 2024-09-30

## 2024-09-30 NOTE — TELEPHONE ENCOUNTER
Patient called and stated that she needs a refill on eye drops last prescribed by Dr Junior 4/3/23.  Patient was last seen 5/20/24 and has a follow up with you 11/20/24.  I did pend the script for your approval if you agree please sign

## 2024-11-20 ENCOUNTER — OFFICE VISIT (OUTPATIENT)
Dept: PULMONOLOGY | Age: 79
End: 2024-11-20
Payer: MEDICARE

## 2024-11-20 VITALS
DIASTOLIC BLOOD PRESSURE: 78 MMHG | HEIGHT: 56 IN | WEIGHT: 112.6 LBS | HEART RATE: 73 BPM | OXYGEN SATURATION: 99 % | BODY MASS INDEX: 25.33 KG/M2 | RESPIRATION RATE: 17 BRPM | SYSTOLIC BLOOD PRESSURE: 139 MMHG

## 2024-11-20 DIAGNOSIS — J45.50 SEVERE PERSISTENT ASTHMA, WELL-CONTROLLED: ICD-10-CM

## 2024-11-20 DIAGNOSIS — J82.83 EOSINOPHILIC ASTHMA: Primary | ICD-10-CM

## 2024-11-20 PROCEDURE — 1123F ACP DISCUSS/DSCN MKR DOCD: CPT | Performed by: INTERNAL MEDICINE

## 2024-11-20 PROCEDURE — 1159F MED LIST DOCD IN RCRD: CPT | Performed by: INTERNAL MEDICINE

## 2024-11-20 PROCEDURE — 1036F TOBACCO NON-USER: CPT | Performed by: INTERNAL MEDICINE

## 2024-11-20 PROCEDURE — G8417 CALC BMI ABV UP PARAM F/U: HCPCS | Performed by: INTERNAL MEDICINE

## 2024-11-20 PROCEDURE — 1090F PRES/ABSN URINE INCON ASSESS: CPT | Performed by: INTERNAL MEDICINE

## 2024-11-20 PROCEDURE — G8399 PT W/DXA RESULTS DOCUMENT: HCPCS | Performed by: INTERNAL MEDICINE

## 2024-11-20 PROCEDURE — G8427 DOCREV CUR MEDS BY ELIG CLIN: HCPCS | Performed by: INTERNAL MEDICINE

## 2024-11-20 PROCEDURE — 99214 OFFICE O/P EST MOD 30 MIN: CPT | Performed by: INTERNAL MEDICINE

## 2024-11-20 PROCEDURE — G8484 FLU IMMUNIZE NO ADMIN: HCPCS | Performed by: INTERNAL MEDICINE

## 2024-11-20 RX ORDER — ALBUTEROL SULFATE 90 UG/1
2 INHALANT RESPIRATORY (INHALATION) EVERY 6 HOURS PRN
Qty: 3 EACH | Refills: 3 | Status: SHIPPED | OUTPATIENT
Start: 2024-11-20

## 2024-11-20 RX ORDER — SPIRONOLACTONE 25 MG/1
TABLET ORAL
COMMUNITY
Start: 2024-09-08

## 2024-11-20 RX ORDER — ALBUTEROL SULFATE 0.83 MG/ML
1.25 SOLUTION RESPIRATORY (INHALATION) EVERY 6 HOURS PRN
Qty: 120 EACH | Refills: 11 | Status: SHIPPED | OUTPATIENT
Start: 2024-11-20

## 2024-11-20 RX ORDER — MELOXICAM 7.5 MG/1
TABLET ORAL
COMMUNITY
Start: 2024-08-22

## 2024-11-20 NOTE — PROGRESS NOTES
PULMONARY OP  PROGRESS NOTE      Patient:  Lianet Alfredo  YOB: 1945    MRN: [id number]     Acct:        Pt seen and Chart reviewed.  Lianet Alfredo is here in followup for   1. Eosinophilic asthma    2. Severe persistent asthma, well-controlled          History of Present Illness  The patient is a 79-year-old female who presents for a follow-up for asthma. She is a patient of Dr. Junior and has eosinophil asthma, a severe persistent form that is currently well controlled.    She reports no hospitalizations or emergency room visits for asthma since her last consultation with Dr. Junior six months ago. Her current asthma management includes Dulera 200, administered as 2 puffs twice daily, Singulair 10 mg once daily, and Nucala injections every 4 weeks. She also uses albuterol daily. She experiences occasional wheezing and shortness of breath, particularly when descending stairs. She requests a refill of her albuterol inhaler. She reports no current coughing or wheezing.    Her sleep pattern involves 4 to 5 hours of sleep at night, followed by an early morning wake-up and a subsequent nap. She does not wake up due to coughing or shortness of breath.    She has not yet received her annual influenza vaccine and is scheduled for a mammogram next week.  She wants to get her influenza vaccination after the mammogram is completed    She went to the emergency room in 10/2024 for itching on her skin.         Review of Systems -   General ROS: Completed and except as mentioned above were negative   Psychological ROS:  Completed and except as mentioned above were negative  Ophthalmic ROS:  Completed and except as mentioned above were negative  ENT ROS:  Completed and except as mentioned above were negative  Allergy and Immunology ROS:  Completed and except as mentioned above werenegative  Hematological and Lymphatic ROS:  Completed and except as mentioned above were negative  Endocrine ROS: Completed and

## 2024-12-26 ENCOUNTER — TELEPHONE (OUTPATIENT)
Dept: PULMONOLOGY | Age: 79
End: 2024-12-26

## 2024-12-26 DIAGNOSIS — J45.50 SEVERE PERSISTENT ASTHMA, WELL-CONTROLLED: ICD-10-CM

## 2024-12-26 RX ORDER — ALBUTEROL SULFATE 90 UG/1
2 INHALANT RESPIRATORY (INHALATION) EVERY 6 HOURS PRN
Qty: 3 EACH | Refills: 3 | Status: SHIPPED | OUTPATIENT
Start: 2024-12-26

## 2024-12-26 NOTE — TELEPHONE ENCOUNTER
Patient called and stated we need to call Nucala to verify refills. I called and spoke to  Pharmacist Marlyn.

## 2025-02-18 ENCOUNTER — TELEPHONE (OUTPATIENT)
Dept: PULMONOLOGY | Age: 80
End: 2025-02-18

## 2025-02-18 DIAGNOSIS — J45.52: ICD-10-CM

## 2025-02-18 RX ORDER — PREDNISONE 10 MG/1
TABLET ORAL
Qty: 30 TABLET | Refills: 0 | Status: SHIPPED | OUTPATIENT
Start: 2025-02-18

## 2025-02-18 RX ORDER — AZITHROMYCIN 250 MG/1
250 TABLET, FILM COATED ORAL SEE ADMIN INSTRUCTIONS
Qty: 6 TABLET | Refills: 0 | Status: SHIPPED | OUTPATIENT
Start: 2025-02-18 | End: 2025-02-23

## 2025-02-18 NOTE — TELEPHONE ENCOUNTER
Patient called and stated that her chest feels tight and she has a lot of wheezing along with a dry cough.  Patient stated that its been going on for about 2 weeks and is not getting any better and would like something called into pharmacy.  Patient was last seen 11/20/24 and has a follow up 5/22/25.  I did pend the last scripts written by Dr Junior for Zpak and prednisone.  Please sign if you agree

## 2025-04-23 ENCOUNTER — TELEPHONE (OUTPATIENT)
Dept: PULMONOLOGY | Age: 80
End: 2025-04-23

## 2025-04-23 DIAGNOSIS — J30.9 ALLERGIC CONJUNCTIVITIS AND RHINITIS, BILATERAL: ICD-10-CM

## 2025-04-23 DIAGNOSIS — H10.13 ALLERGIC CONJUNCTIVITIS AND RHINITIS, BILATERAL: ICD-10-CM

## 2025-04-23 RX ORDER — FLUTICASONE PROPIONATE 50 MCG
1 SPRAY, SUSPENSION (ML) NASAL 2 TIMES DAILY
Qty: 3 EACH | Refills: 3 | Status: SHIPPED | OUTPATIENT
Start: 2025-04-23

## 2025-04-23 NOTE — TELEPHONE ENCOUNTER
Patient called requesting refill on Flonase.  She has no more refills from 8/2025 rx.  Placed order- please sign if accepted. Thank you.     LASHANDA 11/20/24  NV 5/22

## 2025-05-05 DIAGNOSIS — J45.50 SEVERE PERSISTENT ASTHMA, WELL-CONTROLLED (HCC): ICD-10-CM

## 2025-05-05 NOTE — TELEPHONE ENCOUNTER
LAST VISIT: 11/20/24  NEXT VISIT: 5/22/25    Per last dictation patient is on this medication. Please sign for refill if ok. Thank you.

## 2025-05-07 ENCOUNTER — TELEPHONE (OUTPATIENT)
Dept: PULMONOLOGY | Age: 80
End: 2025-05-07

## 2025-05-07 DIAGNOSIS — H10.13 ALLERGIC CONJUNCTIVITIS AND RHINITIS, BILATERAL: ICD-10-CM

## 2025-05-07 DIAGNOSIS — J30.9 ALLERGIC CONJUNCTIVITIS AND RHINITIS, BILATERAL: ICD-10-CM

## 2025-05-07 RX ORDER — FLUTICASONE PROPIONATE 50 MCG
1 SPRAY, SUSPENSION (ML) NASAL 2 TIMES DAILY
Qty: 3 EACH | Refills: 3 | Status: SHIPPED | OUTPATIENT
Start: 2025-05-07

## 2025-05-07 RX ORDER — FLUTICASONE PROPIONATE 50 MCG
1 SPRAY, SUSPENSION (ML) NASAL 2 TIMES DAILY
Qty: 3 EACH | Refills: 3 | Status: CANCELLED | OUTPATIENT
Start: 2025-05-07

## 2025-05-07 NOTE — TELEPHONE ENCOUNTER
Patient called regarding Flonase.  Prescription was sent to wrong pharmacy.  [facility] and reordered to [facility].  Please sign if accepted.  Thank you

## 2025-05-12 RX ORDER — MEPOLIZUMAB 100 MG/ML
INJECTION, SOLUTION SUBCUTANEOUS
Qty: 1 ML | Refills: 8 | Status: ACTIVE | OUTPATIENT
Start: 2025-05-12

## 2025-05-12 NOTE — TELEPHONE ENCOUNTER
Last appointment: 11/20/24  Next appointment: 5/22/25  Last fill date: 3/27/24    Requested Prescriptions     Pending Prescriptions Disp Refills    NUCALA 100 MG/ML SOAJ injection [Pharmacy Med Name: NUCALA AUTO-INJECTOR 100MG/ML] 1 mL 8     Sig: INJECT 100MG SUBCUTANEOUSLY  EVERY 4 WEEKS       Per last dictation, patient is currently taking the pended medication(s). Please sign if agreeable. Thank you.

## 2025-05-22 ENCOUNTER — OFFICE VISIT (OUTPATIENT)
Dept: PULMONOLOGY | Age: 80
End: 2025-05-22
Payer: MEDICARE

## 2025-05-22 VITALS
SYSTOLIC BLOOD PRESSURE: 148 MMHG | HEIGHT: 56 IN | RESPIRATION RATE: 14 BRPM | DIASTOLIC BLOOD PRESSURE: 81 MMHG | WEIGHT: 118 LBS | HEART RATE: 58 BPM | OXYGEN SATURATION: 98 % | BODY MASS INDEX: 26.54 KG/M2

## 2025-05-22 DIAGNOSIS — R09.82 POST-NASAL DRIP: ICD-10-CM

## 2025-05-22 DIAGNOSIS — J45.50 SEVERE PERSISTENT ASTHMA, WELL-CONTROLLED (HCC): ICD-10-CM

## 2025-05-22 DIAGNOSIS — J82.83 EOSINOPHILIC ASTHMA: Primary | ICD-10-CM

## 2025-05-22 PROCEDURE — 99214 OFFICE O/P EST MOD 30 MIN: CPT | Performed by: INTERNAL MEDICINE

## 2025-05-22 PROCEDURE — 1123F ACP DISCUSS/DSCN MKR DOCD: CPT | Performed by: INTERNAL MEDICINE

## 2025-05-22 PROCEDURE — 1036F TOBACCO NON-USER: CPT | Performed by: INTERNAL MEDICINE

## 2025-05-22 PROCEDURE — G8399 PT W/DXA RESULTS DOCUMENT: HCPCS | Performed by: INTERNAL MEDICINE

## 2025-05-22 PROCEDURE — 1159F MED LIST DOCD IN RCRD: CPT | Performed by: INTERNAL MEDICINE

## 2025-05-22 PROCEDURE — 1090F PRES/ABSN URINE INCON ASSESS: CPT | Performed by: INTERNAL MEDICINE

## 2025-05-22 PROCEDURE — G8417 CALC BMI ABV UP PARAM F/U: HCPCS | Performed by: INTERNAL MEDICINE

## 2025-05-22 PROCEDURE — G8427 DOCREV CUR MEDS BY ELIG CLIN: HCPCS | Performed by: INTERNAL MEDICINE

## 2025-05-22 NOTE — PROGRESS NOTES
PULMONARY OP  PROGRESS NOTE      Patient:  Lianet Alfredo  YOB: 1945    MRN: [id number]     Acct:        Pt seen and Chart reviewed.  Lianet Alfredo is here in followup for   1. Eosinophilic asthma    2. Severe persistent asthma, well-controlled (HCC)    3. Post-nasal drip          History of Present Illness  The patient is a 80-year-old female who presents for follow-up of eosinophilic asthma and severe persistent asthma. She is accompanied by her granddaughter.    She reports no current shortness of breath but experiences it when she does a lot of activity. She also reports wheezing and a mild cough, accompanied by a sensation of tickling in her throat. She has been using albuterol inhaler twice daily to manage her symptoms. She is on a regimen of Nucala injections every 4 weeks, Dulera 2 puffs twice daily, and Singulair 1 tablet daily. She used prednisone in February 2025 but has not used it since then. She has Flonase at home and uses it as needed. She also takes Atarax (hydroxyzine).    Supplemental Information  She had an ER visit last month due to a fall where she hit her shoulder and could not raise her hand. She is on Keppra. She is going to see her doctor next month to check if her blood work is okay.    MEDICATIONS  Current: Albuterol, Nucala, Dulera, Singulair, Flonase, Atarax, Keppra.  Past: Prednisone.         Review of Systems -   Constitutional ROS: negative  ENT ROS: negative  Allergy and Immunology ROS: negative  Respiratory ROS: negative  Cardiovascular ROS: negative  Gastrointestinal ROS: negative  Musculoskeletal ROS: negative         Allergies:  Allergies   Allergen Reactions    Albuterol      Can only use ProAir    Hydrochlorothiazide      Hypokalemia despite supplementation    Levalbuterol      Can only use ProAir     Lisinopril Other (See Comments)    Dulera [Mometasone Furo-Formoterol Fum] Hives     Not allergic to symbicort    Nifedipine Rash and Swelling

## 2025-06-02 DIAGNOSIS — J45.50 SEVERE PERSISTENT ASTHMA, WELL-CONTROLLED (HCC): ICD-10-CM

## 2025-06-02 RX ORDER — MONTELUKAST SODIUM 10 MG/1
10 TABLET ORAL NIGHTLY
Qty: 90 TABLET | Refills: 3 | Status: SHIPPED | OUTPATIENT
Start: 2025-06-02

## 2025-06-02 NOTE — TELEPHONE ENCOUNTER
LAST VISIT: 5/22/25  NEXT VISIT: 11/19/25    Per last dictation patient is on this medication. Please sign for refill if ok. Thank you.

## 2025-08-11 ENCOUNTER — TELEPHONE (OUTPATIENT)
Dept: PULMONOLOGY | Age: 80
End: 2025-08-11